# Patient Record
Sex: MALE | Race: WHITE
[De-identification: names, ages, dates, MRNs, and addresses within clinical notes are randomized per-mention and may not be internally consistent; named-entity substitution may affect disease eponyms.]

---

## 2017-03-21 ENCOUNTER — HOSPITAL ENCOUNTER (INPATIENT)
Dept: HOSPITAL 23 - CED | Age: 57
LOS: 3 days | Discharge: HOME | DRG: 897 | End: 2017-03-24
Admitting: INTERNAL MEDICINE
Payer: MEDICARE

## 2017-03-21 DIAGNOSIS — F10.239: Primary | ICD-10-CM

## 2017-03-21 DIAGNOSIS — Z82.49: ICD-10-CM

## 2017-03-21 DIAGNOSIS — F32.9: ICD-10-CM

## 2017-03-21 DIAGNOSIS — Z88.5: ICD-10-CM

## 2017-03-21 DIAGNOSIS — E66.9: ICD-10-CM

## 2017-03-21 DIAGNOSIS — Z80.8: ICD-10-CM

## 2017-03-21 DIAGNOSIS — Z90.49: ICD-10-CM

## 2017-03-21 DIAGNOSIS — F17.210: ICD-10-CM

## 2017-03-21 DIAGNOSIS — Z91.19: ICD-10-CM

## 2017-03-21 DIAGNOSIS — K21.9: ICD-10-CM

## 2017-03-21 DIAGNOSIS — S81.811A: ICD-10-CM

## 2017-03-21 DIAGNOSIS — F39: ICD-10-CM
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
## 2017-03-21 LAB
ALCOHOL BLOOD: 170 MG/DL
BARBITURATES UR QL SCN: 0.9 MG/DL (ref 0.2–2)
BARBITURATES UR QL SCN: 4.2 G/DL (ref 3.5–5)
BARBITURATES: (no result)
BASOPHIL#: 0 X10E3 (ref 0–0.3)
BASOPHIL%: 0.4 % (ref 0–2.5)
BENZODIAZ UR QL SCN: 48 U/L (ref 10–40)
BENZODIAZ UR QL SCN: 64 U/L (ref 10–42)
BENZODIAZEPINES: (no result)
BLOOD UREA NITROGEN: 11 MG/DL (ref 9–23)
BUN/CREATININE RATIO: 12.22
BZE UR QL SCN: 88 U/L (ref 32–92)
CALCIUM SERUM: 8.4 MG/DL (ref 8.4–10.2)
CK MB SERPL-RTO: 16.1 % (ref 11–15.5)
CK MB SERPL-RTO: 33.3 G/DL (ref 30–36)
COCAINE: (no result)
CREATININE SERUM: 0.9 MG/DL (ref 0.6–1.4)
DIFF IND: NO
DX ICD CODE: (no result)
DX ICD CODE: (no result)
EOSINOPHIL#: 0.1 X10E3 (ref 0–0.7)
EOSINOPHIL%: 0.7 % (ref 0–7)
GENTAMICIN PEAK SERPL-MCNC: NO MG/L
GLOM FILT RATE ESTIMATED: (no result) ML/MIN (ref 60–?)
GLUCOSE FASTING: 105 MG/DL (ref 70–110)
HEMATOCRIT: 47.5 % (ref 38–50)
HEMOGLOBIN: 15.8 GM/DL (ref 13–16)
HIV1+2 AB SPEC QL IA.RAPID: 24.4 SECONDS (ref 23.5–31.3)
INFLUENZA A: (no result)
INFLUENZA B: (no result)
INR: 1.1
KETONES UR QL: 102 MMOL/L (ref 100–111)
KETONES UR QL: 22 MMOL/L (ref 22–31)
LYMPHOCYTE#: 5.5 X10E3 (ref 1–3.5)
LYMPHOCYTE%: 46.1 % (ref 17–45)
MEAN CELL VOLUME: 90.6 FL (ref 83–96)
MEAN CORPUSCULAR HEMOGLOBIN: 30.1 PG (ref 28–34)
MEAN PLATELET VOLUME: 7.3 FL (ref 6.5–11.5)
METHADONE UR QL SCN: 3 NG/ML (ref 0–7.9)
MONOCYTE#: 0.6 X10E3 (ref 0–1)
MONOCYTE%: 5.1 % (ref 3–12)
NEUTROPHIL#: 5.7 X10E3 (ref 1.5–7.1)
NEUTROPHIL%: 47.7 % (ref 40–75)
OPIATES: (no result)
PCP UR QL SCN: 216 IU/L (ref 36–174)
PLATELET COUNT: 226 X10E3 (ref 140–420)
POC - TROPONIN: <0.05 NG/ML (ref ?–0.05)
POTASSIUM: 4 MMOL/L (ref 3.5–5.1)
PROTEIN TOTAL SERUM: 8.7 G/DL (ref 6–8.3)
PROTHROMBIN TIME (PATIENT): 11.4 SECONDS (ref 9.6–11.5)
RED BLOOD COUNT: 5.25 X10E (ref 3.9–5.6)
SODIUM: 141 MMOL/L (ref 135–145)
TRICYCLIC ANTIDEPRESSANTS: (no result)
U HYALINE CASTS AUWI: (no result) /[LPF]
U METHADONE: (no result)
URBCS1 AUWI: (no result) /[HPF] (ref 0–2)
URINE APPEARANCE: CLEAR
URINE BACTERIA AUWI: (no result)
URINE BILIRUBIN: (no result)
URINE BLOOD: (no result)
URINE COLOR: YELLOW
URINE GLUCOSE: (no result) MG/DL
URINE KETONE: (no result)
URINE LEUKOCYTE ESTERASE: (no result)
URINE NITRATE: (no result)
URINE PH: 6.5 (ref 5–8)
URINE PROTEIN: (no result)
URINE SOURCE: (no result)
URINE SPECIFIC GRAVITY: 1.02 (ref 1–1.03)
URINE SQUAMOUS EPITHELIAL CELL: (no result) /[HPF]
URINE UROBILINOGEN: 1 MG/DL
UWBCS1 AUWI: (no result) (ref 0–5)
WHITE BLOOD COUNT: 12 X10E3 (ref 4–10.5)

## 2017-03-21 PROCEDURE — G0480 DRUG TEST DEF 1-7 CLASSES: HCPCS

## 2017-03-21 PROCEDURE — C1751 CATH, INF, PER/CENT/MIDLINE: HCPCS

## 2017-03-22 LAB
BARBITURATES UR QL SCN: 1.1 MG/DL (ref 0.2–2)
BARBITURATES UR QL SCN: 3.7 G/DL (ref 3.5–5)
BASOPHIL#: 0 X10E3 (ref 0–0.3)
BASOPHIL%: 0.3 % (ref 0–2.5)
BENZODIAZ UR QL SCN: 38 U/L (ref 10–40)
BENZODIAZ UR QL SCN: 49 U/L (ref 10–42)
BLOOD UREA NITROGEN: 11 MG/DL (ref 9–23)
BUN/CREATININE RATIO: 13.75
BZE UR QL SCN: 78 U/L (ref 32–92)
CALCIUM SERUM: 8.3 MG/DL (ref 8.4–10.2)
CK MB SERPL-RTO: 15.3 % (ref 11–15.5)
CK MB SERPL-RTO: 33.4 G/DL (ref 30–36)
CREATININE SERUM: 0.8 MG/DL (ref 0.6–1.4)
DIFF IND: NO
EOSINOPHIL#: 0.1 X10E3 (ref 0–0.7)
EOSINOPHIL%: 1 % (ref 0–7)
FREE THYROXIN (T4): 0.64 NG/DL (ref 0.58–1.64)
GLOM FILT RATE ESTIMATED: (no result) ML/MIN (ref 60–?)
GLUCOSE FASTING: 119 MG/DL (ref 70–110)
HEMATOCRIT: 40 % (ref 38–50)
HEMOGLOBIN: 13.4 GM/DL (ref 13–16)
KETONES UR QL: 105 MMOL/L (ref 100–111)
KETONES UR QL: 24 MMOL/L (ref 22–31)
LYMPHOCYTE#: 2.8 X10E3 (ref 1–3.5)
LYMPHOCYTE%: 46.7 % (ref 17–45)
MEAN CELL VOLUME: 90.2 FL (ref 83–96)
MEAN CORPUSCULAR HEMOGLOBIN: 30.1 PG (ref 28–34)
MEAN PLATELET VOLUME: 7.2 FL (ref 6.5–11.5)
MONOCYTE#: 0.5 X10E3 (ref 0–1)
MONOCYTE%: 8.7 % (ref 3–12)
NEUTROPHIL#: 2.6 X10E3 (ref 1.5–7.1)
NEUTROPHIL%: 43.3 % (ref 40–75)
PLATELET COUNT: 126 X10E3 (ref 140–420)
POTASSIUM: 3.6 MMOL/L (ref 3.5–5.1)
PROTEIN TOTAL SERUM: 7.4 G/DL (ref 6–8.3)
RED BLOOD COUNT: 4.44 X10E (ref 3.9–5.6)
SODIUM: 138 MMOL/L (ref 135–145)
THYROID STIMULATING HORMONE: 1.07 UIU/ML (ref 0.34–5.6)
WHITE BLOOD COUNT: 5.9 X10E3 (ref 4–10.5)

## 2017-03-23 LAB
BLOOD UREA NITROGEN: 8 MG/DL (ref 9–23)
BUN/CREATININE RATIO: 13.33
CALCIUM SERUM: 8.6 MG/DL (ref 8.4–10.2)
CK MB SERPL-RTO: 15.9 % (ref 11–15.5)
CK MB SERPL-RTO: 33.9 G/DL (ref 30–36)
CREATININE SERUM: 0.6 MG/DL (ref 0.6–1.4)
GLOM FILT RATE ESTIMATED: (no result) ML/MIN (ref 60–?)
GLUCOSE FASTING: 123 MG/DL (ref 70–110)
HEMATOCRIT: 37.2 % (ref 38–50)
HEMOGLOBIN: 12.6 GM/DL (ref 13–16)
KETONES UR QL: 107 MMOL/L (ref 100–111)
KETONES UR QL: 24 MMOL/L (ref 22–31)
MAGNESIUM: 1.6 MG/DL (ref 1.6–3)
MEAN CELL VOLUME: 89.2 FL (ref 83–96)
MEAN CORPUSCULAR HEMOGLOBIN: 30.3 PG (ref 28–34)
MEAN PLATELET VOLUME: 7.3 FL (ref 6.5–11.5)
PLATELET COUNT: 102 X10E3 (ref 140–420)
POTASSIUM: 3.2 MMOL/L (ref 3.5–5.1)
RED BLOOD COUNT: 4.17 X10E (ref 3.9–5.6)
SODIUM: 138 MMOL/L (ref 135–145)
WHITE BLOOD COUNT: 4.6 X10E3 (ref 4–10.5)

## 2017-03-23 PROCEDURE — 02HV33Z INSERTION OF INFUSION DEVICE INTO SUPERIOR VENA CAVA, PERCUTANEOUS APPROACH: ICD-10-PCS | Performed by: RADIOLOGY

## 2017-03-23 PROCEDURE — B548ZZA ULTRASONOGRAPHY OF SUPERIOR VENA CAVA, GUIDANCE: ICD-10-PCS | Performed by: RADIOLOGY

## 2017-03-23 PROCEDURE — B518YZA FLUOROSCOPY OF SUPERIOR VENA CAVA USING OTHER CONTRAST, GUIDANCE: ICD-10-PCS | Performed by: RADIOLOGY

## 2017-03-24 LAB
BLOOD UREA NITROGEN: 11 MG/DL (ref 9–23)
BUN/CREATININE RATIO: 15.71
CALCIUM SERUM: 8.9 MG/DL (ref 8.4–10.2)
CREATININE SERUM: 0.7 MG/DL (ref 0.6–1.4)
GLOM FILT RATE ESTIMATED: 104.8 ML/MIN (ref 60–?)
GLUCOSE FASTING: 99 MG/DL (ref 70–110)
KETONES UR QL: 108 MMOL/L (ref 100–111)
KETONES UR QL: 24 MMOL/L (ref 22–31)
MAGNESIUM: 1.8 MG/DL (ref 1.6–3)
POTASSIUM: 3.9 MMOL/L (ref 3.5–5.1)
SODIUM: 139 MMOL/L (ref 135–145)

## 2017-03-31 ENCOUNTER — HOSPITAL ENCOUNTER (INPATIENT)
Dept: HOSPITAL 23 - P1E | Age: 57
LOS: 10 days | Discharge: HOME | DRG: 897 | End: 2017-04-10
Admitting: SPECIALIST
Payer: MEDICARE

## 2017-03-31 DIAGNOSIS — I10: ICD-10-CM

## 2017-03-31 DIAGNOSIS — F10.239: Primary | ICD-10-CM

## 2017-03-31 DIAGNOSIS — S90.121A: ICD-10-CM

## 2017-03-31 DIAGNOSIS — F10.24: ICD-10-CM

## 2017-03-31 PROCEDURE — G0480 DRUG TEST DEF 1-7 CLASSES: HCPCS

## 2017-04-01 LAB
BARBITURATES UR QL SCN: 0.8 MG/DL (ref 0.2–2)
BARBITURATES UR QL SCN: 3.7 G/DL (ref 3.5–5)
BASOPHIL#: 0 X10E3 (ref 0–0.3)
BASOPHIL%: 0.4 % (ref 0–2.5)
BENZODIAZ UR QL SCN: 30 U/L (ref 10–40)
BENZODIAZ UR QL SCN: 42 U/L (ref 10–42)
BLOOD UREA NITROGEN: 7 MG/DL (ref 9–23)
BUN/CREATININE RATIO: 6.36
BZE UR QL SCN: 71 U/L (ref 32–92)
CALCIUM SERUM: 9.2 MG/DL (ref 8.4–10.2)
CK MB SERPL-RTO: 16.5 % (ref 11–15.5)
CK MB SERPL-RTO: 33 G/DL (ref 30–36)
CREATININE SERUM: 1.1 MG/DL (ref 0.6–1.4)
DIFF IND: NO
EOSINOPHIL#: 0.1 X10E3 (ref 0–0.7)
EOSINOPHIL%: 1.3 % (ref 0–7)
FREE THYROXIN (T4): 0.54 NG/DL (ref 0.58–1.64)
GLOM FILT RATE ESTIMATED: 74.1 ML/MIN (ref 60–?)
GLUCOSE FASTING: 114 MG/DL (ref 70–110)
HEMATOCRIT: 43 % (ref 38–50)
HEMOGLOBIN: 14.2 GM/DL (ref 13–16)
KETONES UR QL: 103 MMOL/L (ref 100–111)
KETONES UR QL: 27 MMOL/L (ref 22–31)
LYMPHOCYTE#: 2.9 X10E3 (ref 1–3.5)
LYMPHOCYTE%: 40.7 % (ref 17–45)
MEAN CELL VOLUME: 92.8 FL (ref 83–96)
MEAN CORPUSCULAR HEMOGLOBIN: 30.7 PG (ref 28–34)
MEAN PLATELET VOLUME: 7.9 FL (ref 6.5–11.5)
MONOCYTE#: 0.9 X10E3 (ref 0–1)
MONOCYTE%: 12.2 % (ref 3–12)
NEUTROPHIL#: 3.2 X10E3 (ref 1.5–7.1)
NEUTROPHIL%: 45.4 % (ref 40–75)
PLATELET COUNT: 179 X10E3 (ref 140–420)
POTASSIUM: 3.9 MMOL/L (ref 3.5–5.1)
PROTEIN TOTAL SERUM: 7.6 G/DL (ref 6–8.3)
RED BLOOD COUNT: 4.63 X10E (ref 3.9–5.6)
SODIUM: 142 MMOL/L (ref 135–145)
THYROID STIMULATING HORMONE: 1.83 UIU/ML (ref 0.34–5.6)
WHITE BLOOD COUNT: 7.1 X10E3 (ref 4–10.5)

## 2017-04-02 LAB
BARBITURATES: (no result)
BENZODIAZEPINES: (no result)
COCAINE: (no result)
DX ICD CODE: (no result)
DX ICD CODE: (no result)
OPIATES: (no result)
TRICYCLIC ANTIDEPRESSANTS: (no result)
U METHADONE: (no result)
URINE APPEARANCE: CLEAR
URINE BILIRUBIN: (no result)
URINE BLOOD: (no result)
URINE COLOR: YELLOW
URINE GLUCOSE: (no result) MG/DL
URINE KETONE: (no result)
URINE LEUKOCYTE ESTERASE: (no result)
URINE NITRATE: (no result)
URINE PH: 7 (ref 5–8)
URINE PROTEIN: (no result)
URINE SOURCE: (no result)
URINE SPECIFIC GRAVITY: 1.01 (ref 1–1.03)
URINE UROBILINOGEN: 0.2 MG/DL

## 2017-04-28 ENCOUNTER — HOSPITAL ENCOUNTER (INPATIENT)
Dept: HOSPITAL 23 - P1E | Age: 57
LOS: 5 days | Discharge: HOME | DRG: 897 | End: 2017-05-03
Admitting: SPECIALIST
Payer: MEDICARE

## 2017-04-28 DIAGNOSIS — F10.239: Primary | ICD-10-CM

## 2017-04-28 DIAGNOSIS — F10.24: ICD-10-CM

## 2017-04-28 DIAGNOSIS — I85.00: ICD-10-CM

## 2017-04-28 DIAGNOSIS — B19.20: ICD-10-CM

## 2017-04-28 DIAGNOSIS — I10: ICD-10-CM

## 2017-04-28 DIAGNOSIS — Z86.73: ICD-10-CM

## 2017-04-28 PROCEDURE — HZ2ZZZZ DETOXIFICATION SERVICES FOR SUBSTANCE ABUSE TREATMENT: ICD-10-PCS | Performed by: SPECIALIST

## 2017-05-01 LAB
ANISOCYTOSIS: (no result)
BARBITURATES UR QL SCN: 0.7 MG/DL
BARBITURATES UR QL SCN: 3.6 G/DL
BASOPHIL#: 0 X10E3
BASOPHIL%: 0.3 %
BENZODIAZ UR QL SCN: 27 U/L
BENZODIAZ UR QL SCN: 33 U/L
BLOOD UREA NITROGEN: 13 MG/DL
BUN/CREATININE RATIO: 16.25
BZE UR QL SCN: 70 U/L
CALCIUM SERUM: 9 MG/DL
CK MB SERPL-RTO: 14.9 %
CK MB SERPL-RTO: 33.3 G/DL
CREATININE SERUM: 0.8 MG/DL
DIFF IND: YES
EOSINOPHIL#: 0.1 X10E3
EOSINOPHIL%: 2.9 %
EOSINOPHIL: 3 %
GLOM FILT RATE ESTIMATED: 99.2 ML/MIN
GLUCOSE FASTING: 141 MG/DL
HEMATOCRIT: 38.5 %
HEMOGLOBIN: 12.8 GM/DL
KETONES UR QL: 106 MMOL/L
KETONES UR QL: 27 MMOL/L
LYMPHOCYTE#: 1.9 X10E3
LYMPHOCYTE%: 48.6 %
LYMPHOCYTE: 49 %
MEAN CELL VOLUME: 93 FL
MEAN CORPUSCULAR HEMOGLOBIN: 31 PG
MEAN PLATELET VOLUME: 8.5 FL
MONOCYTE#: 0.2 X10E3
MONOCYTE%: 4 %
MONOCYTE: 2 %
NEUTROPHIL#: 1.8 X10E3
NEUTROPHIL%: 44.2 %
NEUTROPHIL: 46 %
PLATELET COUNT: 92 X10E3
PLATELET ESTIMATE: (no result)
POTASSIUM: 3.8 MMOL/L
PROTEIN TOTAL SERUM: 7 G/DL
RED BLOOD COUNT: 4.15 X10E
SODIUM: 138 MMOL/L
WHITE BLOOD COUNT: 4 X10E3

## 2017-05-02 LAB
BARBITURATES: (no result)
BENZODIAZEPINES: (no result)
COCAINE: (no result)
DX ICD CODE: (no result)
DX ICD CODE: (no result)
OPIATES: (no result)
TRICYCLIC ANTIDEPRESSANTS: (no result)
U METHADONE: (no result)
URINE APPEARANCE: (no result)
URINE BILIRUBIN: (no result)
URINE BLOOD: (no result)
URINE COLOR: (no result)
URINE GLUCOSE: (no result) MG/DL
URINE KETONE: (no result)
URINE LEUKOCYTE ESTERASE: (no result)
URINE NITRATE: (no result)
URINE PH: 8
URINE PROTEIN: (no result)
URINE SOURCE: (no result)
URINE SPECIFIC GRAVITY: 1.02
URINE UROBILINOGEN: 1 MG/DL

## 2017-07-10 ENCOUNTER — HOSPITAL ENCOUNTER (INPATIENT)
Dept: HOSPITAL 23 - P2S | Age: 57
LOS: 4 days | DRG: 897 | End: 2017-07-14
Attending: SPECIALIST | Admitting: SPECIALIST
Payer: MEDICARE

## 2017-07-10 DIAGNOSIS — Z88.5: ICD-10-CM

## 2017-07-10 DIAGNOSIS — I10: ICD-10-CM

## 2017-07-10 DIAGNOSIS — F11.23: ICD-10-CM

## 2017-07-10 DIAGNOSIS — F10.239: Primary | ICD-10-CM

## 2017-07-10 DIAGNOSIS — I85.00: ICD-10-CM

## 2017-07-10 DIAGNOSIS — B19.20: ICD-10-CM

## 2017-07-10 DIAGNOSIS — Z86.73: ICD-10-CM

## 2017-07-10 DIAGNOSIS — F10.24: ICD-10-CM

## 2017-07-11 PROCEDURE — HZ2ZZZZ DETOXIFICATION SERVICES FOR SUBSTANCE ABUSE TREATMENT: ICD-10-PCS | Performed by: SPECIALIST

## 2017-07-17 ENCOUNTER — HOSPITAL ENCOUNTER (INPATIENT)
Dept: HOSPITAL 23 - CED | Age: 57
LOS: 8 days | Discharge: HOME | DRG: 896 | End: 2017-07-25
Attending: INTERNAL MEDICINE | Admitting: INTERNAL MEDICINE
Payer: MEDICARE

## 2017-07-17 VITALS — WEIGHT: 218.26 LBS | BODY MASS INDEX: 32.33 KG/M2 | HEIGHT: 69 IN

## 2017-07-17 DIAGNOSIS — K76.6: ICD-10-CM

## 2017-07-17 DIAGNOSIS — B37.81: ICD-10-CM

## 2017-07-17 DIAGNOSIS — R19.7: ICD-10-CM

## 2017-07-17 DIAGNOSIS — K29.20: ICD-10-CM

## 2017-07-17 DIAGNOSIS — K85.20: ICD-10-CM

## 2017-07-17 DIAGNOSIS — F10.239: Primary | ICD-10-CM

## 2017-07-17 DIAGNOSIS — Z80.9: ICD-10-CM

## 2017-07-17 DIAGNOSIS — Z82.3: ICD-10-CM
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
## 2017-07-17 DIAGNOSIS — K70.30: ICD-10-CM

## 2017-07-17 DIAGNOSIS — K72.90: ICD-10-CM

## 2017-07-17 DIAGNOSIS — Z82.49: ICD-10-CM

## 2017-07-17 DIAGNOSIS — I48.0: ICD-10-CM

## 2017-07-17 DIAGNOSIS — Z90.49: ICD-10-CM

## 2017-07-17 DIAGNOSIS — I10: ICD-10-CM

## 2017-07-17 LAB
ACETAMINOPHEN: <10 UG/ML
ALCOHOL BLOOD: 328 MG/DL
BAND: 4 % (ref 0–10)
BARBITURATES UR QL SCN: 0.8 MG/DL (ref 0.2–2)
BARBITURATES UR QL SCN: 3.8 G/DL (ref 3.5–5)
BARBITURATES: (no result)
BASOPHIL#: 0 X10E3 (ref 0–0.3)
BASOPHIL%: 0.5 % (ref 0–2.5)
BENZODIAZ UR QL SCN: 28 U/L (ref 10–40)
BENZODIAZ UR QL SCN: 41 U/L (ref 10–42)
BENZODIAZEPINES: (no result)
BLOOD UREA NITROGEN: 5 MG/DL (ref 9–23)
BUN/CREATININE RATIO: 6.25
BZE UR QL SCN: 65 U/L (ref 32–92)
CALCIUM SERUM: 8.5 MG/DL (ref 8.4–10.2)
CK MB SERPL-RTO: 15 % (ref 11–15.5)
CK MB SERPL-RTO: 33.2 G/DL (ref 30–36)
COCAINE: (no result)
CREATININE SERUM: 0.8 MG/DL (ref 0.6–1.4)
DIFF IND: YES
DX ICD CODE: (no result)
DX ICD CODE: (no result)
EOSINOPHIL#: 0 X10E3 (ref 0–0.7)
EOSINOPHIL%: 0.9 % (ref 0–7)
EOSINOPHIL: 1 % (ref 0–7)
ETHANOL BLD GC-MCNC: <4 MG/DL
GLOM FILT RATE ESTIMATED: 99.2 ML/MIN (ref 60–?)
GLUCOSE FASTING: 186 MG/DL (ref 70–110)
HEMATOCRIT: 38.2 % (ref 38–50)
HEMOGLOBIN: 12.7 GM/DL (ref 13–16)
HIV1+2 AB SPEC QL IA.RAPID: 25.2 SECONDS (ref 23.5–31.3)
INR: 1
KETONES UR QL: 111 MMOL/L (ref 100–111)
KETONES UR QL: 26 MMOL/L (ref 22–31)
LYMPHOCYTE#: 1.8 X10E3 (ref 1–3.5)
LYMPHOCYTE%: 45.3 % (ref 17–45)
LYMPHOCYTE: 47 % (ref 17–50)
MACROCYTOSIS: (no result)
MEAN CELL VOLUME: 94.4 FL (ref 83–96)
MEAN CORPUSCULAR HEMOGLOBIN: 31.4 PG (ref 28–34)
MEAN PLATELET VOLUME: 8.3 FL (ref 6.5–11.5)
METHADONE UR QL SCN: 2 NG/ML (ref 0–7.9)
MONOCYTE#: 0.5 X10E3 (ref 0–1)
MONOCYTE%: 13.5 % (ref 3–12)
MONOCYTE: 9 % (ref 3–12)
NEUTROPHIL#: 1.6 X10E3 (ref 1.5–7.1)
NEUTROPHIL%: 39.8 % (ref 40–75)
NEUTROPHIL: 39 % (ref 40–75)
OPIATES: (no result)
PLATELET COUNT: 81 X10E3 (ref 140–420)
PLATELET ESTIMATE: (no result)
POC - TROPONIN: <0.05 NG/ML (ref ?–0.05)
POTASSIUM: 3.9 MMOL/L (ref 3.5–5.1)
PROTEIN TOTAL SERUM: 7.7 G/DL (ref 6–8.3)
PROTHROMBIN TIME (PATIENT): 11.3 SECONDS (ref 10–11.7)
RED BLOOD COUNT: 4.05 X10E (ref 3.9–5.6)
SODIUM: 144 MMOL/L (ref 135–145)
TRICYCLIC ANTIDEPRESSANTS: (no result)
U METHADONE: (no result)
WHITE BLOOD COUNT: 4.1 X10E3 (ref 4–10.5)

## 2017-07-17 PROCEDURE — G0480 DRUG TEST DEF 1-7 CLASSES: HCPCS

## 2017-07-17 PROCEDURE — C9113 INJ PANTOPRAZOLE SODIUM, VIA: HCPCS

## 2017-07-18 LAB
AMYLASE: 39 U/L (ref 0–46)
ANISOCYTOSIS: (no result)
BAND: 3 % (ref 0–10)
BASOPHIL#: 0 X10E3 (ref 0–0.3)
BASOPHIL%: 0.4 % (ref 0–2.5)
BLOOD UREA NITROGEN: 5 MG/DL (ref 9–23)
BUN/CREATININE RATIO: 8.33
CALCIUM SERUM: 8.3 MG/DL (ref 8.4–10.2)
CK MB SERPL-RTO: 15.3 % (ref 11–15.5)
CK MB SERPL-RTO: 33.2 G/DL (ref 30–36)
CREATININE SERUM: 0.6 MG/DL (ref 0.6–1.4)
DIFF IND: YES
EOSINOPHIL#: 0.1 X10E3 (ref 0–0.7)
EOSINOPHIL%: 1.8 % (ref 0–7)
EOSINOPHIL: 1 % (ref 0–7)
GLOM FILT RATE ESTIMATED: 111.7 ML/MIN (ref 60–?)
GLUCOSE FASTING: 110 MG/DL (ref 70–110)
HEMATOCRIT: 36.2 % (ref 38–50)
HEMOGLOBIN: 12 GM/DL (ref 13–16)
KETONES UR QL: 105 MMOL/L (ref 100–111)
KETONES UR QL: 28 MMOL/L (ref 22–31)
LIPASE: 23 U/L (ref 22–51)
LYMPHOCYTE#: 1.2 X10E3 (ref 1–3.5)
LYMPHOCYTE%: 41.8 % (ref 17–45)
LYMPHOCYTE: 41 % (ref 17–50)
MEAN CELL VOLUME: 93.2 FL (ref 83–96)
MEAN CORPUSCULAR HEMOGLOBIN: 31 PG (ref 28–34)
MEAN PLATELET VOLUME: 7.5 FL (ref 6.5–11.5)
MONOCYTE#: 0.4 X10E3 (ref 0–1)
MONOCYTE%: 13.6 % (ref 3–12)
MONOCYTE: 13 % (ref 3–12)
NEUTROPHIL#: 1.2 X10E3 (ref 1.5–7.1)
NEUTROPHIL%: 42.4 % (ref 40–75)
NEUTROPHIL: 42 % (ref 40–75)
PLATELET COUNT: 94 X10E3 (ref 140–420)
PLATELET ESTIMATE: (no result)
POIKILOCYTOSIS: (no result)
POTASSIUM: 3.5 MMOL/L (ref 3.5–5.1)
RED BLOOD COUNT: 3.89 X10E (ref 3.9–5.6)
SODIUM: 139 MMOL/L (ref 135–145)
WHITE BLOOD COUNT: 2.8 X10E3 (ref 4–10.5)

## 2017-07-19 LAB
AMYLASE: 28 U/L (ref 0–46)
BARBITURATES UR QL SCN: 1 MG/DL (ref 0.2–2)
BARBITURATES UR QL SCN: 3.8 G/DL (ref 3.5–5)
BENZODIAZ UR QL SCN: 30 U/L (ref 10–40)
BENZODIAZ UR QL SCN: 47 U/L (ref 10–42)
BLOOD UREA NITROGEN: 5 MG/DL (ref 9–23)
BUN/CREATININE RATIO: 6.25
BZE UR QL SCN: 80 U/L (ref 32–92)
CALCIUM SERUM: 8.7 MG/DL (ref 8.4–10.2)
CK MB SERPL-RTO: 15.4 % (ref 11–15.5)
CK MB SERPL-RTO: 33.1 G/DL (ref 30–36)
CREATININE SERUM: 0.8 MG/DL (ref 0.6–1.4)
GLOM FILT RATE ESTIMATED: 99.2 ML/MIN (ref 60–?)
GLUCOSE FASTING: 87 MG/DL (ref 70–110)
HEMATOCRIT: 39.4 % (ref 38–50)
HEMOGLOBIN: 13.1 GM/DL (ref 13–16)
HIV1+2 AB SPEC QL IA.RAPID: 27.8 SECONDS (ref 23.5–31.3)
INR: 1.1
KETONES UR QL: 102 MMOL/L (ref 100–111)
KETONES UR QL: 27 MMOL/L (ref 22–31)
LIPASE: 18 U/L (ref 22–51)
MEAN CELL VOLUME: 93.7 FL (ref 83–96)
MEAN CORPUSCULAR HEMOGLOBIN: 31.1 PG (ref 28–34)
MEAN PLATELET VOLUME: 7.6 FL (ref 6.5–11.5)
PLATELET COUNT: 132 X10E3 (ref 140–420)
POTASSIUM: 3.7 MMOL/L (ref 3.5–5.1)
PROTEIN TOTAL SERUM: 7.9 G/DL (ref 6–8.3)
PROTHROMBIN TIME (PATIENT): 11.7 SECONDS (ref 10–11.7)
RED BLOOD COUNT: 4.2 X10E (ref 3.9–5.6)
SODIUM: 136 MMOL/L (ref 135–145)
WHITE BLOOD COUNT: 4.8 X10E3 (ref 4–10.5)

## 2017-07-20 LAB
BARBITURATES UR QL SCN: 1.2 MG/DL (ref 0.2–2)
BARBITURATES UR QL SCN: 3.8 G/DL (ref 3.5–5)
BENZODIAZ UR QL SCN: 34 U/L (ref 10–40)
BENZODIAZ UR QL SCN: 60 U/L (ref 10–42)
BLOOD UREA NITROGEN: 6 MG/DL (ref 9–23)
BUN/CREATININE RATIO: 7.5
BZE UR QL SCN: 86 U/L (ref 32–92)
CALCIUM SERUM: 8.5 MG/DL (ref 8.4–10.2)
CK MB SERPL-RTO: 15.2 % (ref 11–15.5)
CK MB SERPL-RTO: 33.4 G/DL (ref 30–36)
CREATININE SERUM: 0.8 MG/DL (ref 0.6–1.4)
GLOM FILT RATE ESTIMATED: 99.2 ML/MIN (ref 60–?)
GLUCOSE FASTING: 88 MG/DL (ref 70–110)
HEMATOCRIT: 41.2 % (ref 38–50)
HEMOGLOBIN: 13.8 GM/DL (ref 13–16)
KETONES UR QL: 24 MMOL/L (ref 22–31)
KETONES UR QL: 99 MMOL/L (ref 100–111)
MAGNESIUM: 1.7 MG/DL (ref 1.6–3)
MEAN CELL VOLUME: 93.8 FL (ref 83–96)
MEAN CORPUSCULAR HEMOGLOBIN: 31.4 PG (ref 28–34)
MEAN PLATELET VOLUME: 7.6 FL (ref 6.5–11.5)
PHOSPHOROUS: 2.3 MG/DL (ref 2.5–4.6)
PLATELET COUNT: 134 X10E3 (ref 140–420)
POTASSIUM: 4.2 MMOL/L (ref 3.5–5.1)
PROTEIN TOTAL SERUM: 7.7 G/DL (ref 6–8.3)
RED BLOOD COUNT: 4.39 X10E (ref 3.9–5.6)
SODIUM: 133 MMOL/L (ref 135–145)
WHITE BLOOD COUNT: 7.2 X10E3 (ref 4–10.5)

## 2017-07-20 PROCEDURE — 0DB58ZX EXCISION OF ESOPHAGUS, VIA NATURAL OR ARTIFICIAL OPENING ENDOSCOPIC, DIAGNOSTIC: ICD-10-PCS | Performed by: SURGERY

## 2017-07-20 PROCEDURE — 0DB78ZX EXCISION OF STOMACH, PYLORUS, VIA NATURAL OR ARTIFICIAL OPENING ENDOSCOPIC, DIAGNOSTIC: ICD-10-PCS | Performed by: SURGERY

## 2017-07-21 LAB
BARBITURATES UR QL SCN: 1.3 MG/DL (ref 0.2–2)
BARBITURATES UR QL SCN: 3.9 G/DL (ref 3.5–5)
BENZODIAZ UR QL SCN: 33 U/L (ref 10–40)
BENZODIAZ UR QL SCN: 55 U/L (ref 10–42)
BLOOD UREA NITROGEN: 9 MG/DL (ref 9–23)
BUN/CREATININE RATIO: 9
BZE UR QL SCN: 76 U/L (ref 32–92)
CALCIUM SERUM: 8.4 MG/DL (ref 8.4–10.2)
CK MB SERPL-RTO: 15.3 % (ref 11–15.5)
CK MB SERPL-RTO: 33.6 G/DL (ref 30–36)
CREATININE SERUM: 1 MG/DL (ref 0.6–1.4)
GLOM FILT RATE ESTIMATED: 83.2 ML/MIN (ref 60–?)
GLUCOSE FASTING: 115 MG/DL (ref 70–110)
HEMATOCRIT: 39.5 % (ref 38–50)
HEMOGLOBIN: 13.3 GM/DL (ref 13–16)
KETONES UR QL: 26 MMOL/L (ref 22–31)
KETONES UR QL: 99 MMOL/L (ref 100–111)
MAGNESIUM: 1.8 MG/DL (ref 1.6–3)
MEAN CELL VOLUME: 93.2 FL (ref 83–96)
MEAN CORPUSCULAR HEMOGLOBIN: 31.3 PG (ref 28–34)
MEAN PLATELET VOLUME: 7 FL (ref 6.5–11.5)
PHOSPHOROUS: 2.6 MG/DL (ref 2.5–4.6)
PLATELET COUNT: 147 X10E3 (ref 140–420)
POTASSIUM: 3.7 MMOL/L (ref 3.5–5.1)
PROTEIN TOTAL SERUM: 8.2 G/DL (ref 6–8.3)
RED BLOOD COUNT: 4.24 X10E (ref 3.9–5.6)
SODIUM: 132 MMOL/L (ref 135–145)
WHITE BLOOD COUNT: 8.5 X10E3 (ref 4–10.5)

## 2017-07-22 LAB
BLOOD UREA NITROGEN: 9 MG/DL (ref 9–23)
BUN/CREATININE RATIO: 11.25
CALCIUM SERUM: 8.1 MG/DL (ref 8.4–10.2)
CK MB SERPL-RTO: 15.4 % (ref 11–15.5)
CK MB SERPL-RTO: 33.2 G/DL (ref 30–36)
CREATININE SERUM: 0.8 MG/DL (ref 0.6–1.4)
GLOM FILT RATE ESTIMATED: 99.2 ML/MIN (ref 60–?)
GLUCOSE FASTING: 91 MG/DL (ref 70–110)
HEMATOCRIT: 39.1 % (ref 38–50)
HEMOGLOBIN: 13 GM/DL (ref 13–16)
KETONES UR QL: 109 MMOL/L (ref 100–111)
KETONES UR QL: 23 MMOL/L (ref 22–31)
MAGNESIUM: 1.9 MG/DL (ref 1.6–3)
MEAN CELL VOLUME: 93.6 FL (ref 83–96)
MEAN CORPUSCULAR HEMOGLOBIN: 31.1 PG (ref 28–34)
MEAN PLATELET VOLUME: 7.6 FL (ref 6.5–11.5)
PLATELET COUNT: 111 X10E3 (ref 140–420)
POTASSIUM: 4.3 MMOL/L (ref 3.5–5.1)
RED BLOOD COUNT: 4.17 X10E (ref 3.9–5.6)
SODIUM: 139 MMOL/L (ref 135–145)
WHITE BLOOD COUNT: 6.5 X10E3 (ref 4–10.5)

## 2017-07-23 LAB
GENTAMICIN PEAK SERPL-MCNC: NO MG/L
URINE APPEARANCE: CLEAR
URINE BILIRUBIN: (no result)
URINE BLOOD: (no result)
URINE COLOR: YELLOW
URINE GLUCOSE: (no result) MG/DL
URINE KETONE: (no result)
URINE LEUKOCYTE ESTERASE: (no result)
URINE NITRATE: (no result)
URINE PH: 5 (ref 5–8)
URINE PROTEIN: (no result)
URINE SOURCE: (no result)
URINE SPECIFIC GRAVITY: 1.01 (ref 1–1.03)
URINE UROBILINOGEN: 0.2 MG/DL

## 2017-07-24 LAB
BLOOD UREA NITROGEN: 5 MG/DL (ref 9–23)
BUN/CREATININE RATIO: 6.25
CALCIUM SERUM: 8.5 MG/DL (ref 8.4–10.2)
CK MB SERPL-RTO: 15.4 % (ref 11–15.5)
CK MB SERPL-RTO: 33.5 G/DL (ref 30–36)
CREATININE SERUM: 0.8 MG/DL (ref 0.6–1.4)
GLOM FILT RATE ESTIMATED: 99.2 ML/MIN (ref 60–?)
GLUCOSE FASTING: 98 MG/DL (ref 70–110)
HEMATOCRIT: 37.3 % (ref 38–50)
HEMOGLOBIN: 12.5 GM/DL (ref 13–16)
KETONES UR QL: 101 MMOL/L (ref 100–111)
KETONES UR QL: 30 MMOL/L (ref 22–31)
MEAN CELL VOLUME: 92.7 FL (ref 83–96)
MEAN CORPUSCULAR HEMOGLOBIN: 31 PG (ref 28–34)
MEAN PLATELET VOLUME: 7.3 FL (ref 6.5–11.5)
PLATELET COUNT: 156 X10E3 (ref 140–420)
POTASSIUM: 3.4 MMOL/L (ref 3.5–5.1)
RED BLOOD COUNT: 4.02 X10E (ref 3.9–5.6)
SODIUM: 138 MMOL/L (ref 135–145)
WHITE BLOOD COUNT: 5.5 X10E3 (ref 4–10.5)

## 2017-07-24 PROCEDURE — B246YZZ ULTRASONOGRAPHY OF RIGHT AND LEFT HEART USING OTHER CONTRAST: ICD-10-PCS | Performed by: INTERNAL MEDICINE

## 2017-07-25 LAB
BLOOD UREA NITROGEN: 7 MG/DL (ref 9–23)
BUN/CREATININE RATIO: 7.77
CALCIUM SERUM: 9 MG/DL (ref 8.4–10.2)
CREATININE SERUM: 0.9 MG/DL (ref 0.6–1.4)
GLOM FILT RATE ESTIMATED: 94.5 ML/MIN (ref 60–?)
GLUCOSE FASTING: 99 MG/DL (ref 70–110)
KETONES UR QL: 100 MMOL/L (ref 100–111)
KETONES UR QL: 31 MMOL/L (ref 22–31)
LIPASE: 60 U/L (ref 22–51)
MAGNESIUM: 2 MG/DL (ref 1.6–3)
PHOSPHOROUS: 4.6 MG/DL (ref 2.5–4.6)
POTASSIUM: 3.9 MMOL/L (ref 3.5–5.1)
SODIUM: 139 MMOL/L (ref 135–145)

## 2017-07-31 ENCOUNTER — HOSPITAL ENCOUNTER (INPATIENT)
Dept: HOSPITAL 23 - P1E | Age: 57
LOS: 6 days | Discharge: HOME | DRG: 897 | End: 2017-08-06
Attending: SPECIALIST | Admitting: SPECIALIST
Payer: MEDICARE

## 2017-07-31 VITALS — BODY MASS INDEX: 31.39 KG/M2 | HEIGHT: 67 IN | WEIGHT: 200 LBS
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
## 2017-07-31 DIAGNOSIS — K70.30: ICD-10-CM

## 2017-07-31 DIAGNOSIS — B19.20: ICD-10-CM

## 2017-07-31 DIAGNOSIS — I10: ICD-10-CM

## 2017-07-31 DIAGNOSIS — F10.24: ICD-10-CM

## 2017-07-31 DIAGNOSIS — F10.239: Primary | ICD-10-CM

## 2017-07-31 DIAGNOSIS — I85.10: ICD-10-CM

## 2017-07-31 PROCEDURE — G0480 DRUG TEST DEF 1-7 CLASSES: HCPCS

## 2017-07-31 PROCEDURE — C9113 INJ PANTOPRAZOLE SODIUM, VIA: HCPCS

## 2017-08-01 PROCEDURE — HZ2ZZZZ DETOXIFICATION SERVICES FOR SUBSTANCE ABUSE TREATMENT: ICD-10-PCS | Performed by: SPECIALIST

## 2017-08-11 ENCOUNTER — HOSPITAL ENCOUNTER (INPATIENT)
Dept: HOSPITAL 23 - P1E | Age: 57
LOS: 7 days | Discharge: HOME | DRG: 897 | End: 2017-08-18
Attending: SPECIALIST | Admitting: SPECIALIST
Payer: MEDICARE

## 2017-08-11 VITALS — HEIGHT: 67 IN | WEIGHT: 249 LBS | BODY MASS INDEX: 39.08 KG/M2

## 2017-08-11 DIAGNOSIS — B18.2: ICD-10-CM

## 2017-08-11 DIAGNOSIS — K21.9: ICD-10-CM

## 2017-08-11 DIAGNOSIS — I10: ICD-10-CM

## 2017-08-11 DIAGNOSIS — M79.1: ICD-10-CM

## 2017-08-11 DIAGNOSIS — F10.24: ICD-10-CM

## 2017-08-11 DIAGNOSIS — F10.230: Primary | ICD-10-CM

## 2017-08-11 DIAGNOSIS — K74.60: ICD-10-CM

## 2017-08-11 PROCEDURE — HZ2ZZZZ DETOXIFICATION SERVICES FOR SUBSTANCE ABUSE TREATMENT: ICD-10-PCS | Performed by: SPECIALIST

## 2017-08-29 ENCOUNTER — HOSPITAL ENCOUNTER (EMERGENCY)
Dept: HOSPITAL 23 - CED | Age: 57
Discharge: HOME | End: 2017-08-29
Payer: MEDICARE

## 2017-08-29 VITALS — BODY MASS INDEX: 30.31 KG/M2 | HEIGHT: 68 IN | WEIGHT: 199.98 LBS

## 2017-08-29 DIAGNOSIS — R55: Primary | ICD-10-CM

## 2017-08-29 DIAGNOSIS — I10: ICD-10-CM

## 2017-08-29 DIAGNOSIS — F15.10: ICD-10-CM

## 2017-08-29 LAB
ALCOHOL BLOOD: <5 MG/DL
BARBITURATES UR QL SCN: 1.3 MG/DL (ref 0.2–2)
BARBITURATES UR QL SCN: 3.9 G/DL (ref 3.5–5)
BARBITURATES: (no result)
BASOPHIL#: 0 X10E3 (ref 0–0.3)
BASOPHIL%: 0.8 % (ref 0–2.5)
BENZODIAZ UR QL SCN: 30 U/L (ref 10–40)
BENZODIAZ UR QL SCN: 36 U/L (ref 10–42)
BENZODIAZEPINES: (no result)
BLOOD UREA NITROGEN: 15 MG/DL (ref 9–23)
BUN/CREATININE RATIO: 15
BZE UR QL SCN: 50 U/L (ref 32–92)
CALCIUM SERUM: 8.7 MG/DL (ref 8.4–10.2)
CK MB SERPL-RTO: 16.1 % (ref 11–15.5)
CK MB SERPL-RTO: 33.1 G/DL (ref 30–36)
COCAINE: (no result)
CREATININE SERUM: 1 MG/DL (ref 0.6–1.4)
DIFF IND: NO
DX ICD CODE: (no result)
DX ICD CODE: (no result)
EOSINOPHIL#: 0.1 X10E3 (ref 0–0.7)
EOSINOPHIL%: 1 % (ref 0–7)
GLOM FILT RATE ESTIMATED: 83.2 ML/MIN (ref 60–?)
GLUCOSE FASTING: 120 MG/DL (ref 70–110)
HEMATOCRIT: 39.7 % (ref 38–50)
HEMOGLOBIN: 13.1 GM/DL (ref 13–16)
INR: 1.1
KETONES UR QL: 108 MMOL/L (ref 100–111)
KETONES UR QL: 25 MMOL/L (ref 22–31)
LYMPHOCYTE#: 1.9 X10E3 (ref 1–3.5)
LYMPHOCYTE%: 30.1 % (ref 17–45)
MEAN CELL VOLUME: 92.4 FL (ref 83–96)
MEAN CORPUSCULAR HEMOGLOBIN: 30.6 PG (ref 28–34)
MEAN PLATELET VOLUME: 7.5 FL (ref 6.5–11.5)
METHADONE UR QL SCN: 1.9 NG/ML (ref 0–7.9)
MONOCYTE#: 0.4 X10E3 (ref 0–1)
MONOCYTE%: 7 % (ref 3–12)
NEUTROPHIL#: 3.8 X10E3 (ref 1.5–7.1)
NEUTROPHIL%: 61.1 % (ref 40–75)
OPIATES: (no result)
PLATELET COUNT: 180 X10E3 (ref 140–420)
POC - TROPONIN: <0.05 NG/ML (ref ?–0.05)
POTASSIUM: 4.1 MMOL/L (ref 3.5–5.1)
PROTEIN TOTAL SERUM: 7.6 G/DL (ref 6–8.3)
PROTHROMBIN TIME (PATIENT): 12.2 SECONDS (ref 10–11.7)
RED BLOOD COUNT: 4.29 X10E (ref 3.9–5.6)
SODIUM: 140 MMOL/L (ref 135–145)
TRICYCLIC ANTIDEPRESSANTS: (no result)
U METHADONE: (no result)
WHITE BLOOD COUNT: 6.3 X10E3 (ref 4–10.5)

## 2017-08-29 PROCEDURE — G0480 DRUG TEST DEF 1-7 CLASSES: HCPCS

## 2017-09-10 ENCOUNTER — HOSPITAL ENCOUNTER (INPATIENT)
Facility: HOSPITAL | Age: 57
LOS: 3 days | Discharge: HOME | End: 2017-09-13
Attending: EMERGENCY MEDICINE | Admitting: INTERNAL MEDICINE

## 2017-09-10 DIAGNOSIS — I48.91 ATRIAL FIBRILLATION WITH RVR (HCC): Primary | ICD-10-CM

## 2017-09-10 DIAGNOSIS — F10.929 ALCOHOL INTOXICATION, WITH UNSPECIFIED COMPLICATION (HCC): ICD-10-CM

## 2017-09-10 DIAGNOSIS — F10.10 ALCOHOL ABUSE: ICD-10-CM

## 2017-09-10 PROBLEM — F10.931 DTS (DELIRIUM TREMENS) (HCC): Status: ACTIVE | Noted: 2017-09-10

## 2017-09-10 PROBLEM — D72.820 LYMPHOCYTOSIS: Status: ACTIVE | Noted: 2017-09-10

## 2017-09-10 LAB
ALBUMIN SERPL-MCNC: 4.5 G/DL (ref 3.5–5.2)
ALBUMIN/GLOB SERPL: 1.1 G/DL
ALP SERPL-CCNC: 82 U/L (ref 39–117)
ALT SERPL W P-5'-P-CCNC: 55 U/L (ref 1–41)
ANION GAP SERPL CALCULATED.3IONS-SCNC: 14.9 MMOL/L
ANION GAP SERPL CALCULATED.3IONS-SCNC: 20.7 MMOL/L
AST SERPL-CCNC: 95 U/L (ref 1–40)
BASOPHILS # BLD AUTO: 0.02 10*3/MM3 (ref 0–0.2)
BASOPHILS NFR BLD AUTO: 0.2 % (ref 0–1.5)
BILIRUB SERPL-MCNC: 0.4 MG/DL (ref 0.1–1.2)
BUN BLD-MCNC: 4 MG/DL (ref 6–20)
BUN BLD-MCNC: 4 MG/DL (ref 6–20)
BUN/CREAT SERPL: 5.1 (ref 7–25)
BUN/CREAT SERPL: 5.6 (ref 7–25)
CALCIUM SPEC-SCNC: 7.7 MG/DL (ref 8.6–10.5)
CALCIUM SPEC-SCNC: 8.9 MG/DL (ref 8.6–10.5)
CHLORIDE SERPL-SCNC: 102 MMOL/L (ref 98–107)
CHLORIDE SERPL-SCNC: 104 MMOL/L (ref 98–107)
CLUMPED PLATELETS: PRESENT
CO2 SERPL-SCNC: 23.3 MMOL/L (ref 22–29)
CO2 SERPL-SCNC: 26.1 MMOL/L (ref 22–29)
CREAT BLD-MCNC: 0.71 MG/DL (ref 0.76–1.27)
CREAT BLD-MCNC: 0.79 MG/DL (ref 0.76–1.27)
DEPRECATED RDW RBC AUTO: 47.9 FL (ref 37–54)
EOSINOPHIL # BLD AUTO: 0.03 10*3/MM3 (ref 0–0.7)
EOSINOPHIL NFR BLD AUTO: 0.4 % (ref 0.3–6.2)
ERYTHROCYTE [DISTWIDTH] IN BLOOD BY AUTOMATED COUNT: 14.6 % (ref 11.5–14.5)
ETHANOL BLD-MCNC: 282 MG/DL (ref 0–10)
ETHANOL BLD-MCNC: 417 MG/DL (ref 0–10)
ETHANOL UR QL: 0.28 %
ETHANOL UR QL: 0.42 %
GFR SERPL CREATININE-BSD FRML MDRD: 101 ML/MIN/1.73
GFR SERPL CREATININE-BSD FRML MDRD: 114 ML/MIN/1.73
GLOBULIN UR ELPH-MCNC: 4.2 GM/DL
GLUCOSE BLD-MCNC: 104 MG/DL (ref 65–99)
GLUCOSE BLD-MCNC: 117 MG/DL (ref 65–99)
HCT VFR BLD AUTO: 41.9 % (ref 40.4–52.2)
HGB BLD-MCNC: 14.2 G/DL (ref 13.7–17.6)
IMM GRANULOCYTES # BLD: 0 10*3/MM3 (ref 0–0.03)
IMM GRANULOCYTES NFR BLD: 0 % (ref 0–0.5)
INR PPP: 1.08 (ref 0.9–1.1)
LYMPHOCYTES # BLD AUTO: 5.52 10*3/MM3 (ref 0.9–4.8)
LYMPHOCYTES NFR BLD AUTO: 66.9 % (ref 19.6–45.3)
MCH RBC QN AUTO: 30.9 PG (ref 27–32.7)
MCHC RBC AUTO-ENTMCNC: 33.9 G/DL (ref 32.6–36.4)
MCV RBC AUTO: 91.1 FL (ref 79.8–96.2)
MONOCYTES # BLD AUTO: 0.69 10*3/MM3 (ref 0.2–1.2)
MONOCYTES NFR BLD AUTO: 8.4 % (ref 5–12)
NEUTROPHILS # BLD AUTO: 1.99 10*3/MM3 (ref 1.9–8.1)
NEUTROPHILS NFR BLD AUTO: 24.1 % (ref 42.7–76)
NRBC BLD MANUAL-RTO: 0 /100 WBC (ref 0–0)
PLATELET # BLD AUTO: 156 10*3/MM3 (ref 140–500)
PMV BLD AUTO: 9.9 FL (ref 6–12)
POLYCHROMASIA BLD QL SMEAR: NORMAL
POTASSIUM BLD-SCNC: 3.5 MMOL/L (ref 3.5–5.2)
POTASSIUM BLD-SCNC: 4 MMOL/L (ref 3.5–5.2)
PROT SERPL-MCNC: 8.7 G/DL (ref 6–8.5)
PROTHROMBIN TIME: 13.6 SECONDS (ref 11.7–14.2)
RBC # BLD AUTO: 4.6 10*6/MM3 (ref 4.6–6)
SODIUM BLD-SCNC: 145 MMOL/L (ref 136–145)
SODIUM BLD-SCNC: 146 MMOL/L (ref 136–145)
TROPONIN T SERPL-MCNC: <0.01 NG/ML (ref 0–0.03)
WBC MORPH BLD: NORMAL
WBC NRBC COR # BLD: 8.25 10*3/MM3 (ref 4.5–10.7)

## 2017-09-10 PROCEDURE — 25010000002 ONDANSETRON PER 1 MG: Performed by: INTERNAL MEDICINE

## 2017-09-10 PROCEDURE — 85610 PROTHROMBIN TIME: CPT | Performed by: EMERGENCY MEDICINE

## 2017-09-10 PROCEDURE — 80307 DRUG TEST PRSMV CHEM ANLYZR: CPT | Performed by: EMERGENCY MEDICINE

## 2017-09-10 PROCEDURE — 85025 COMPLETE CBC W/AUTO DIFF WBC: CPT | Performed by: EMERGENCY MEDICINE

## 2017-09-10 PROCEDURE — 93005 ELECTROCARDIOGRAM TRACING: CPT | Performed by: EMERGENCY MEDICINE

## 2017-09-10 PROCEDURE — 36415 COLL VENOUS BLD VENIPUNCTURE: CPT

## 2017-09-10 PROCEDURE — 25010000002 THIAMINE PER 100 MG: Performed by: EMERGENCY MEDICINE

## 2017-09-10 PROCEDURE — 25010000002 ONDANSETRON PER 1 MG

## 2017-09-10 PROCEDURE — 85007 BL SMEAR W/DIFF WBC COUNT: CPT | Performed by: EMERGENCY MEDICINE

## 2017-09-10 PROCEDURE — 25810000003 SODIUM CHLORIDE 0.9 % WITH KCL 20 MEQ 20-0.9 MEQ/L-% SOLUTION: Performed by: INTERNAL MEDICINE

## 2017-09-10 PROCEDURE — 25010000002 MAGNESIUM SULFATE PER 500 MG OF MAGNESIUM: Performed by: EMERGENCY MEDICINE

## 2017-09-10 PROCEDURE — 80053 COMPREHEN METABOLIC PANEL: CPT | Performed by: EMERGENCY MEDICINE

## 2017-09-10 PROCEDURE — HZ2ZZZZ DETOXIFICATION SERVICES FOR SUBSTANCE ABUSE TREATMENT: ICD-10-PCS | Performed by: INTERNAL MEDICINE

## 2017-09-10 PROCEDURE — 84484 ASSAY OF TROPONIN QUANT: CPT | Performed by: EMERGENCY MEDICINE

## 2017-09-10 PROCEDURE — 25010000002 LORAZEPAM PER 2 MG: Performed by: EMERGENCY MEDICINE

## 2017-09-10 PROCEDURE — 93010 ELECTROCARDIOGRAM REPORT: CPT | Performed by: INTERNAL MEDICINE

## 2017-09-10 PROCEDURE — 99284 EMERGENCY DEPT VISIT MOD MDM: CPT

## 2017-09-10 PROCEDURE — 25010000002 LORAZEPAM PER 2 MG: Performed by: INTERNAL MEDICINE

## 2017-09-10 PROCEDURE — 25010000002 ENOXAPARIN PER 10 MG: Performed by: INTERNAL MEDICINE

## 2017-09-10 RX ORDER — DIPHENOXYLATE HYDROCHLORIDE AND ATROPINE SULFATE 2.5; .025 MG/1; MG/1
1 TABLET ORAL DAILY
Status: DISCONTINUED | OUTPATIENT
Start: 2017-09-10 | End: 2017-09-13 | Stop reason: HOSPADM

## 2017-09-10 RX ORDER — LORAZEPAM 1 MG/1
2 TABLET ORAL
Status: DISCONTINUED | OUTPATIENT
Start: 2017-09-10 | End: 2017-09-13 | Stop reason: HOSPADM

## 2017-09-10 RX ORDER — LORAZEPAM 2 MG/ML
2 INJECTION INTRAMUSCULAR
Status: DISCONTINUED | OUTPATIENT
Start: 2017-09-10 | End: 2017-09-13 | Stop reason: HOSPADM

## 2017-09-10 RX ORDER — LORAZEPAM 2 MG/ML
1 INJECTION INTRAMUSCULAR
Status: DISCONTINUED | OUTPATIENT
Start: 2017-09-10 | End: 2017-09-13 | Stop reason: HOSPADM

## 2017-09-10 RX ORDER — VENLAFAXINE HYDROCHLORIDE 150 MG/1
150 CAPSULE, EXTENDED RELEASE ORAL DAILY
Status: DISCONTINUED | OUTPATIENT
Start: 2017-09-10 | End: 2017-09-13 | Stop reason: HOSPADM

## 2017-09-10 RX ORDER — SODIUM CHLORIDE AND POTASSIUM CHLORIDE 150; 900 MG/100ML; MG/100ML
100 INJECTION, SOLUTION INTRAVENOUS CONTINUOUS
Status: DISCONTINUED | OUTPATIENT
Start: 2017-09-10 | End: 2017-09-12

## 2017-09-10 RX ORDER — LORAZEPAM 1 MG/1
1 TABLET ORAL
Status: DISCONTINUED | OUTPATIENT
Start: 2017-09-10 | End: 2017-09-13 | Stop reason: HOSPADM

## 2017-09-10 RX ORDER — PANTOPRAZOLE SODIUM 40 MG/1
40 TABLET, DELAYED RELEASE ORAL EVERY MORNING
Status: DISCONTINUED | OUTPATIENT
Start: 2017-09-10 | End: 2017-09-13 | Stop reason: HOSPADM

## 2017-09-10 RX ORDER — THIAMINE MONONITRATE (VIT B1) 100 MG
100 TABLET ORAL DAILY
Status: DISCONTINUED | OUTPATIENT
Start: 2017-09-10 | End: 2017-09-13 | Stop reason: HOSPADM

## 2017-09-10 RX ORDER — ONDANSETRON 2 MG/ML
INJECTION INTRAMUSCULAR; INTRAVENOUS
Status: COMPLETED
Start: 2017-09-10 | End: 2017-09-10

## 2017-09-10 RX ORDER — ONDANSETRON 2 MG/ML
4 INJECTION INTRAMUSCULAR; INTRAVENOUS EVERY 6 HOURS PRN
Status: DISCONTINUED | OUTPATIENT
Start: 2017-09-10 | End: 2017-09-13 | Stop reason: HOSPADM

## 2017-09-10 RX ORDER — METOPROLOL SUCCINATE 50 MG/1
50 TABLET, EXTENDED RELEASE ORAL DAILY
Status: DISCONTINUED | OUTPATIENT
Start: 2017-09-10 | End: 2017-09-13 | Stop reason: HOSPADM

## 2017-09-10 RX ORDER — ONDANSETRON 2 MG/ML
4 INJECTION INTRAMUSCULAR; INTRAVENOUS ONCE
Status: COMPLETED | OUTPATIENT
Start: 2017-09-10 | End: 2017-09-10

## 2017-09-10 RX ORDER — LORAZEPAM 2 MG/ML
0.5 INJECTION INTRAMUSCULAR ONCE
Status: COMPLETED | OUTPATIENT
Start: 2017-09-10 | End: 2017-09-10

## 2017-09-10 RX ADMIN — Medication 1 TABLET: at 17:48

## 2017-09-10 RX ADMIN — POTASSIUM CHLORIDE AND SODIUM CHLORIDE 100 ML/HR: 900; 150 INJECTION, SOLUTION INTRAVENOUS at 15:11

## 2017-09-10 RX ADMIN — LORAZEPAM 2 MG: 2 INJECTION INTRAMUSCULAR; INTRAVENOUS at 23:33

## 2017-09-10 RX ADMIN — ONDANSETRON 4 MG: 2 INJECTION INTRAMUSCULAR; INTRAVENOUS at 23:33

## 2017-09-10 RX ADMIN — VENLAFAXINE HYDROCHLORIDE 150 MG: 150 CAPSULE, EXTENDED RELEASE ORAL at 15:11

## 2017-09-10 RX ADMIN — LORAZEPAM 0.5 MG: 2 INJECTION INTRAMUSCULAR; INTRAVENOUS at 06:07

## 2017-09-10 RX ADMIN — ONDANSETRON 4 MG: 2 INJECTION INTRAMUSCULAR; INTRAVENOUS at 05:48

## 2017-09-10 RX ADMIN — LORAZEPAM 2 MG: 2 INJECTION INTRAMUSCULAR; INTRAVENOUS at 20:20

## 2017-09-10 RX ADMIN — LORAZEPAM 2 MG: 2 INJECTION INTRAMUSCULAR; INTRAVENOUS at 21:28

## 2017-09-10 RX ADMIN — FOLIC ACID 250 ML/HR: 5 INJECTION, SOLUTION INTRAMUSCULAR; INTRAVENOUS; SUBCUTANEOUS at 06:08

## 2017-09-10 RX ADMIN — PANTOPRAZOLE SODIUM 40 MG: 40 TABLET, DELAYED RELEASE ORAL at 15:21

## 2017-09-10 RX ADMIN — LORAZEPAM 2 MG: 2 INJECTION INTRAMUSCULAR; INTRAVENOUS at 19:54

## 2017-09-10 RX ADMIN — ONDANSETRON 4 MG: 2 INJECTION INTRAMUSCULAR; INTRAVENOUS at 06:54

## 2017-09-10 RX ADMIN — ENOXAPARIN SODIUM 40 MG: 40 INJECTION SUBCUTANEOUS at 15:21

## 2017-09-10 RX ADMIN — METOPROLOL SUCCINATE 50 MG: 50 TABLET, FILM COATED, EXTENDED RELEASE ORAL at 15:11

## 2017-09-10 RX ADMIN — LORAZEPAM 2 MG: 2 INJECTION INTRAMUSCULAR; INTRAVENOUS at 15:06

## 2017-09-10 RX ADMIN — Medication 100 MG: at 17:48

## 2017-09-10 RX ADMIN — ONDANSETRON 4 MG: 2 INJECTION INTRAMUSCULAR; INTRAVENOUS at 16:41

## 2017-09-10 RX ADMIN — LORAZEPAM 2 MG: 2 INJECTION INTRAMUSCULAR; INTRAVENOUS at 17:57

## 2017-09-10 RX ADMIN — LORAZEPAM 2 MG: 2 INJECTION INTRAMUSCULAR; INTRAVENOUS at 12:30

## 2017-09-10 NOTE — ED TRIAGE NOTES
ETOH intoxication, headache, SOA.  Brought in by girlfriend.    Binge drinking x 1 week.  Girlfriend thinks he may have had 30 or more beers in the last 24 hours.    Pt was in [facility] for 3 weeks for ETOH abuse.  Discharged 1 week ago.

## 2017-09-10 NOTE — ED PROVIDER NOTES
" EMERGENCY DEPARTMENT ENCOUNTER    CHIEF COMPLAINT  Chief Complaint: alcohol intoxication  History given by: pt  History limited by: nothing  Room Number: 05/05  PMD: Omer Santos MD      HPI:  Pt is a 57 y.o. male who presents complaining of alcohol intoxication for one week. He reports he has been drinking for \"a very long time\". Pt has attempted detox programs previously with various success. Pt admits to nausea.    Duration:  One week  Onset: gradual  Timing: constant  Location: N/A  Radiation: unknown  Quality: intoxication  Intensity/Severity: moderate  Progression: unchanged  Associated Symptoms: nausea  Aggravating Factors: unknown  Alleviating Factors: unknown  Previous Episodes: Pt has been drinking for \"a very long time\"  Treatment before arrival: unknown    PAST MEDICAL HISTORY  Active Ambulatory Problems     Diagnosis Date Noted   • Alcohol withdrawal 08/06/2016   • Benign essential HTN 08/06/2016   • Alcohol abuse 08/06/2016   • History of seizure 08/06/2016   • Alcoholic cirrhosis 08/11/2016     Resolved Ambulatory Problems     Diagnosis Date Noted   • No Resolved Ambulatory Problems     Past Medical History:   Diagnosis Date   • Alcohol abuse    • Alcohol withdrawal seizure    • Alcoholic cirrhosis 8/11/2016   • Alcoholism    • Depression    • Hypertension        PAST SURGICAL HISTORY  History reviewed. No pertinent surgical history.    FAMILY HISTORY  Family History   Problem Relation Age of Onset   • Cancer Mother    • Depression Maternal Aunt        SOCIAL HISTORY  Social History     Social History   • Marital status:      Spouse name: N/A   • Number of children: N/A   • Years of education: N/A     Occupational History   • Not on file.     Social History Main Topics   • Smoking status: Never Smoker   • Smokeless tobacco: Not on file   • Alcohol use Yes      Comment: approx 30 + beers in last 24 hours 0.5 to 1 gallon of vodka daily last 6 months prior 1 gallon/wk  daily 7 months   • " Drug use: Yes   • Sexual activity: No     Other Topics Concern   • Not on file     Social History Narrative   • No narrative on file       ALLERGIES  Review of patient's allergies indicates no known allergies.    REVIEW OF SYSTEMS  Review of Systems   Constitutional: Negative for activity change, appetite change and fever.        Alcohol intoxication   HENT: Negative for congestion and sore throat.    Eyes: Negative.    Respiratory: Negative for cough and shortness of breath.    Cardiovascular: Negative for chest pain and leg swelling.   Gastrointestinal: Positive for nausea. Negative for abdominal pain, diarrhea and vomiting.   Endocrine: Negative.    Genitourinary: Negative for decreased urine volume and dysuria.   Musculoskeletal: Negative for neck pain.   Skin: Negative for rash and wound.   Allergic/Immunologic: Negative.    Neurological: Negative for weakness, numbness and headaches.   Hematological: Negative.    Psychiatric/Behavioral: Negative.    All other systems reviewed and are negative.      PHYSICAL EXAM  ED Triage Vitals   Temp Heart Rate Resp BP SpO2   09/10/17 0402 09/10/17 0402 09/10/17 0402 09/10/17 0405 09/10/17 0402   97.4 °F (36.3 °C) 81 18 148/82 96 %      Temp src Heart Rate Source Patient Position BP Location FiO2 (%)   09/10/17 0402 -- -- -- --   Tympanic           Physical Exam   Constitutional: He is oriented to person, place, and time and well-developed, well-nourished, and in no distress. He appears toxic.   HENT:   Head: Normocephalic and atraumatic.   Eyes: EOM are normal. Pupils are equal, round, and reactive to light.   Neck: Normal range of motion. Neck supple.   Cardiovascular: Normal rate, regular rhythm and normal heart sounds.    Pulmonary/Chest: Effort normal and breath sounds normal. No respiratory distress.   Abdominal: Soft. There is no tenderness. There is no rebound and no guarding.   Musculoskeletal: Normal range of motion. He exhibits no edema.   Neurological: He is  alert and oriented to person, place, and time. He has normal sensation and normal strength. He displays abnormal speech (slurred).   Skin: Skin is warm and dry.   Psychiatric: Mood and affect normal.   Nursing note and vitals reviewed.      LAB RESULTS  Lab Results (last 24 hours)     Procedure Component Value Units Date/Time    CBC & Differential [[id number]] Collected:  09/10/17 0432    Specimen:  Blood Updated:  09/10/17 0533    Narrative:       The following orders were created for panel order CBC & Differential.  Procedure                               Abnormality         Status                     ---------                               -----------         ------                     Scan Slide[[id number]]                                       Final result               CBC Auto Differential[[id number]]        Abnormal            Final result                 Please view results for these tests on the individual orders.    Comprehensive Metabolic Panel [[id number]]  (Abnormal) Collected:  09/10/17 0432    Specimen:  Blood Updated:  09/10/17 0505     Glucose 117 (H) mg/dL      BUN 4 (L) mg/dL      Creatinine 0.79 mg/dL      Sodium 146 (H) mmol/L      Potassium 3.5 mmol/L      Chloride 102 mmol/L      CO2 23.3 mmol/L      Calcium 8.9 mg/dL      Total Protein 8.7 (H) g/dL      Albumin 4.50 g/dL      ALT (SGPT) 55 (H) U/L      AST (SGOT) 95 (H) U/L      Alkaline Phosphatase 82 U/L      Total Bilirubin 0.4 mg/dL      eGFR Non African Amer 101 mL/min/1.73      Globulin 4.2 gm/dL      A/G Ratio 1.1 g/dL      BUN/Creatinine Ratio 5.1 (L)     Anion Gap 20.7 mmol/L     Protime-INR [[id number]]  (Normal) Collected:  09/10/17 0432    Specimen:  Blood Updated:  09/10/17 0500     Protime 13.6 Seconds      INR 1.08    Ethanol [[id number]]  (Abnormal) Collected:  09/10/17 0432    Specimen:  Blood Updated:  09/10/17 0515     Ethanol 417 (C) mg/dL      Ethanol % 0.417 %     CBC Auto Differential [[id number]]  (Abnormal) Collected:   09/10/17 0432    Specimen:  Blood Updated:  09/10/17 0533     WBC 8.25 10*3/mm3      RBC 4.60 10*6/mm3      Hemoglobin 14.2 g/dL      Hematocrit 41.9 %      MCV 91.1 fL      MCH 30.9 pg      MCHC 33.9 g/dL      RDW 14.6 (H) %      RDW-SD 47.9 fl      MPV 9.9 fL      Platelets 156 10*3/mm3      Neutrophil % 24.1 (L) %      Lymphocyte % 66.9 (H) %      Monocyte % 8.4 %      Eosinophil % 0.4 %      Basophil % 0.2 %      Immature Grans % 0.0 %      Neutrophils, Absolute 1.99 10*3/mm3      Lymphocytes, Absolute 5.52 (H) 10*3/mm3      Monocytes, Absolute 0.69 10*3/mm3      Eosinophils, Absolute 0.03 10*3/mm3      Basophils, Absolute 0.02 10*3/mm3      Immature Grans, Absolute 0.00 10*3/mm3      nRBC 0.0 /100 WBC     Scan Slide [[id number]] Collected:  09/10/17 0432    Specimen:  Blood Updated:  09/10/17 0533     Polychromasia Slight/1+     WBC Morphology Normal     Clumped Platelets Present          I ordered the above labs and reviewed the results    PROCEDURES  Procedures    EKG           EKG time: 0632  Rhythm/Rate: a fib with rapid ventricular response  P waves and PA: normal  QRS, axis: normal   ST and T waves: normal     Interpreted Contemporaneously by me, independently viewed  changed compared to prior 10/11/2016     PROGRESS AND CONSULTS  ED Course     0430 - Ordered labs for evaluation.    0519 - Placed Psych consult. Ordered multiple vitamin infusion.    0556 - Ordered Ativan for agitation.    0559 - Ordered Zofran for nausea.    0620 - Ordered EKG for further evaluation.    0634 - Ordered Cardizem for rapid a-fib.    0638 - Placed call to [facility] for admission.    0640 - Discussed pt's case with Dr. Stanley ([facility]), who agreed to admit the pt.    MEDICAL DECISION MAKING  Results were reviewed/discussed with the patient and they were also made aware of online access. Pt also made aware that some labs, such as cultures, will not be resulted during ER visit and follow up with PMD is necessary.     MDM  Number of  Diagnoses or Management Options     Amount and/or Complexity of Data Reviewed  Clinical lab tests: ordered and reviewed (Ethanol - 417)  Tests in the medicine section of CPT®: ordered and reviewed (See EKG procedure note)  Discuss the patient with other providers: yes (Dr. Stanley ([facility]))           DIAGNOSIS  Final diagnoses:   Atrial fibrillation with RVR   Alcohol abuse   Alcohol intoxication, with unspecified complication       DISPOSITION  ADMISSION    Discussed treatment plan and reason for admission with pt/family and admitting physician.  Pt/family voiced understanding of the plan for admission for further testing/treatment as needed.       Latest Documented Vital Signs:  As of 6:41 AM  BP- (!) 133/109 HR- (!) 156 Temp- 97.4 °F (36.3 °C) (Tympanic) O2 sat- 97%    --  Documentation assistance provided by andrew Fritz for Dr. Burrell.  Information recorded by the scribe was done at my direction and has been verified and validated by me.     Tristian Fritz  09/10/17 0642       Anastacio Burrell MD  09/10/17 0710

## 2017-09-10 NOTE — PLAN OF CARE
Problem: Patient Care Overview (Adult)  Goal: Plan of Care Review  Outcome: Ongoing (interventions implemented as appropriate)    09/10/17 1638   Coping/Psychosocial Response Interventions   Plan Of Care Reviewed With patient   Patient Care Overview   Progress no change   Outcome Evaluation   Outcome Summary/Follow up Plan Adittted to the floor. CIWA protocol followed. Vitals stable. Will continue to monitor.         Problem: Acute Alcohol Withdrawal Syndrome, Risk For/Actual (Adult)  Goal: Signs and Symptoms of Listed Potential Problems Will be Absent or Manageable (Acute Alcohol Withdrawal Syndrome, Risk For/Actual)  Outcome: Ongoing (interventions implemented as appropriate)  Goal: Signs and Symptoms of Listed Potential Problems Will be Absent or Manageable (Acute Alcohol Withdrawal Syndrome, Risk For/Actual)  Outcome: Ongoing (interventions implemented as appropriate)    Problem: Pulmonary Hypertension, Persistent ([place],NICU)  Goal: Signs and Symptoms of Listed Potential Problems Will be Absent or Manageable (Pulmonary Hypertension, Persistent)  Outcome: Ongoing (interventions implemented as appropriate)    Problem: Seizure Disorder/Epilepsy (Adult)  Goal: Signs and Symptoms of Listed Potential Problems Will be Absent or Manageable (Seizure Disorder/Epilepsy)  Outcome: Ongoing (interventions implemented as appropriate)    Problem: Fall Risk (Adult)  Goal: Absence of Falls  Outcome: Ongoing (interventions implemented as appropriate)

## 2017-09-10 NOTE — H&P
Name: Ton Edwards ADMIT: 9/10/2017   : 1960  PCP: Omer Santos MD    MRN: [id number] LOS: 0 days   AGE/SEX: 57 y.o. male  ROOM: [facility]/     Chief Complaint   Patient presents with   • Alcohol Intoxication       History of Present Illness  57-year-old male with a long history of alcohol abuse who presented to the emergency room complaining of alcohol intoxication.  His blood alcohol level was 417 and that was at 4:30 AM.  A repeat level at 11:15 was down to 282.  He had received Ativan and when I saw him he could not wake up at all.  His nurse told me that he did wake up after I had been there and that he was hallucinating and seeing bugs and was extremely agitated.  He apparently has a previous history of alcohol withdrawal seizure and has had previous alcohol withdrawal with DTs.  When he arrived in the ER he was apparently in atrial fibrillation with rapid ventricular response.  Diltiazem was ordered but he spontaneously converted before it was given.      Past Medical History:   Diagnosis Date   • Alcohol abuse    • Alcohol withdrawal seizure    • Alcoholic cirrhosis 2016    Seen on CT- had not had biopsy    • Alcoholism    • Depression    • Hypertension      History reviewed. No pertinent surgical history.       Family History   Problem Relation Age of Onset   • Cancer Mother    • Depression Maternal Aunt        Social History   Substance Use Topics   • Smoking status: Never Smoker   • Smokeless tobacco: None   • Alcohol use Yes      Comment: approx 30 + beers in last 24 hours 0.5 to 1 gallon of vodka daily last 6 months prior 1 gallon/wk  daily 7 months       Prescriptions Prior to Admission   Medication Sig Dispense Refill Last Dose   • metoprolol succinate XL (TOPROL XL) 50 MG 24 hr tablet Take 1 tablet by mouth daily. 30 tablet 0 2017   • omeprazole (PriLOSEC) 40 MG capsule Take 1 capsule by mouth daily. 30 capsule 0 2017   • venlafaxine XR (EFFEXOR-XR) 150 MG 24 hr capsule  Take 150 mg by mouth daily.   9/8/2017     Allergies:  Review of patient's allergies indicates no known allergies.    Review of Systems   Unobtainable       Objective    Vital Signs  Temp:  [97.4 °F (36.3 °C)-97.9 °F (36.6 °C)] 97.9 °F (36.6 °C)  Heart Rate:  [] 105  Resp:  [18-22] 22  BP: (102-148)/() 138/74  SpO2:  [87 %-97 %] 96 %  on  Flow (L/min):  [2] 2;   O2 Device: nasal cannula  Body mass index is 32.05 kg/(m^2).    Physical Exam  Disheveled but well-developed well-nourished male in no acute distress  I was able to pull his eyelids open and he didn't respond whatsoever. PERRL, EOMI, sclera nonicteric.  I could not examine his oropharynx.  Neck is supple  Lungs appeared clear but with upper airway noise and snoring  Heart mildly tachycardic but regular   Abdomen obese, soft, bowel sounds present throughout.  No guarding  Extremities no edema      Results Review:   I reviewed the patient's new clinical results.    Results from last 7 days  Lab Units 09/10/17  0432   WBC 10*3/mm3 8.25   HEMOGLOBIN g/dL 14.2   PLATELETS 10*3/mm3 156       Results from last 7 days  Lab Units 09/10/17  0432   SODIUM mmol/L 146*   POTASSIUM mmol/L 3.5   CHLORIDE mmol/L 102   CO2 mmol/L 23.3   BUN mg/dL 4*   CREATININE mg/dL 0.79   GLUCOSE mg/dL 117*   ALBUMIN g/dL 4.50   BILIRUBIN mg/dL 0.4   ALK PHOS U/L 82   AST (SGOT) U/L 95*   ALT (SGPT) U/L 55*   Estimated Creatinine Clearance: 119.4 mL/min (by C-G formula based on Cr of 0.79).    Results from last 7 days  Lab Units 09/10/17  0432   INR  1.08   TROPONIN T ng/mL <0.010           No orders to display     Assessment/Plan   Assessment:     Active Hospital Problems (** Indicates Principal Problem)    Diagnosis Date Noted   • Atrial fibrillation with RVR [I48.91] 09/10/2017   • Lymphocytosis [D72.820] 09/10/2017   • DTs (delirium tremens) [F10.231] 09/10/2017   • Alcoholic cirrhosis [K70.30] 08/11/2016   • Alcohol abuse [F10.10] 08/06/2016   • History of seizure  [Z87.898] 08/06/2016   • Benign essential HTN [I10] 08/06/2016      Resolved Hospital Problems    Diagnosis Date Noted Date Resolved   No resolved problems to display.       Plan:     At this point he is in full blown DTs and that was with his alcohol level still being extremely elevated at 282.  He's been placed on detox protocol.    He is already on Toprol-XL.  He was admitted to [facility] in July of this year and this was not listed on his home medications at that time.  He also was not discharged on it.  I am assuming this is his first episode of atrial fibrillation.  I am sure it is related to his alcohol intoxication and alcohol abuse.  I have not consulted cardiology since he spontaneously converted back to sinus rhythm.  He is not a candidate for anticoagulation given his long-standing alcohol abuse.  It would also be a high risk to put him on any type of antiarrhythmic medication so rate control with Toprol-XL is the only option presently.  [facility] has been asked to see the patient although at this point they will not really be able to be of any benefit.  His hallucinations and agitation are currently being controlled with the Ativan.  He is on a telemetry unit and being monitored closely.           Ana Bishop MD  [facility]  09/10/17  12:58 PM

## 2017-09-11 ENCOUNTER — APPOINTMENT (OUTPATIENT)
Dept: CT IMAGING | Facility: HOSPITAL | Age: 57
End: 2017-09-11

## 2017-09-11 PROBLEM — D69.6 THROMBOCYTOPENIA (HCC): Status: ACTIVE | Noted: 2017-09-11

## 2017-09-11 PROBLEM — D61.818 PANCYTOPENIA (HCC): Status: ACTIVE | Noted: 2017-09-11

## 2017-09-11 PROBLEM — R10.9 ABDOMINAL PAIN: Status: ACTIVE | Noted: 2017-09-11

## 2017-09-11 LAB
ALBUMIN SERPL-MCNC: 3.7 G/DL (ref 3.5–5.2)
ALBUMIN/GLOB SERPL: 1 G/DL
ALP SERPL-CCNC: 66 U/L (ref 39–117)
ALT SERPL W P-5'-P-CCNC: 43 U/L (ref 1–41)
ANION GAP SERPL CALCULATED.3IONS-SCNC: 11.7 MMOL/L
AST SERPL-CCNC: 72 U/L (ref 1–40)
BASOPHILS # BLD AUTO: 0.01 10*3/MM3 (ref 0–0.2)
BASOPHILS NFR BLD AUTO: 0.3 % (ref 0–1.5)
BILIRUB SERPL-MCNC: 0.8 MG/DL (ref 0.1–1.2)
BUN BLD-MCNC: 6 MG/DL (ref 6–20)
BUN/CREAT SERPL: 7.5 (ref 7–25)
CALCIUM SPEC-SCNC: 8.4 MG/DL (ref 8.6–10.5)
CHLORIDE SERPL-SCNC: 102 MMOL/L (ref 98–107)
CO2 SERPL-SCNC: 26.3 MMOL/L (ref 22–29)
CREAT BLD-MCNC: 0.8 MG/DL (ref 0.76–1.27)
DEPRECATED RDW RBC AUTO: 48.7 FL (ref 37–54)
DEPRECATED RDW RBC AUTO: 49 FL (ref 37–54)
EOSINOPHIL # BLD AUTO: 0.03 10*3/MM3 (ref 0–0.7)
EOSINOPHIL NFR BLD AUTO: 0.8 % (ref 0.3–6.2)
ERYTHROCYTE [DISTWIDTH] IN BLOOD BY AUTOMATED COUNT: 14 % (ref 11.5–14.5)
ERYTHROCYTE [DISTWIDTH] IN BLOOD BY AUTOMATED COUNT: 14.2 % (ref 11.5–14.5)
GFR SERPL CREATININE-BSD FRML MDRD: 100 ML/MIN/1.73
GLOBULIN UR ELPH-MCNC: 3.8 GM/DL
GLUCOSE BLD-MCNC: 116 MG/DL (ref 65–99)
HCT VFR BLD AUTO: 37.4 % (ref 40.4–52.2)
HCT VFR BLD AUTO: 40.2 % (ref 40.4–52.2)
HGB BLD-MCNC: 12.1 G/DL (ref 13.7–17.6)
HGB BLD-MCNC: 12.9 G/DL (ref 13.7–17.6)
IMM GRANULOCYTES # BLD: 0 10*3/MM3 (ref 0–0.03)
IMM GRANULOCYTES NFR BLD: 0 % (ref 0–0.5)
LIPASE SERPL-CCNC: 92 U/L (ref 13–60)
LYMPHOCYTES # BLD AUTO: 1.82 10*3/MM3 (ref 0.9–4.8)
LYMPHOCYTES NFR BLD AUTO: 49.2 % (ref 19.6–45.3)
MAGNESIUM SERPL-MCNC: 1.9 MG/DL (ref 1.6–2.6)
MCH RBC QN AUTO: 30.4 PG (ref 27–32.7)
MCH RBC QN AUTO: 30.6 PG (ref 27–32.7)
MCHC RBC AUTO-ENTMCNC: 32.1 G/DL (ref 32.6–36.4)
MCHC RBC AUTO-ENTMCNC: 32.4 G/DL (ref 32.6–36.4)
MCV RBC AUTO: 94 FL (ref 79.8–96.2)
MCV RBC AUTO: 95.5 FL (ref 79.8–96.2)
MONOCYTES # BLD AUTO: 0.31 10*3/MM3 (ref 0.2–1.2)
MONOCYTES NFR BLD AUTO: 8.4 % (ref 5–12)
NEUTROPHILS # BLD AUTO: 1.53 10*3/MM3 (ref 1.9–8.1)
NEUTROPHILS NFR BLD AUTO: 41.3 % (ref 42.7–76)
PLATELET # BLD AUTO: 71 10*3/MM3 (ref 140–500)
PLATELET # BLD AUTO: 81 10*3/MM3 (ref 140–500)
PMV BLD AUTO: 10 FL (ref 6–12)
PMV BLD AUTO: 10.4 FL (ref 6–12)
POTASSIUM BLD-SCNC: 3.7 MMOL/L (ref 3.5–5.2)
PROT SERPL-MCNC: 7.5 G/DL (ref 6–8.5)
RBC # BLD AUTO: 3.98 10*6/MM3 (ref 4.6–6)
RBC # BLD AUTO: 4.21 10*6/MM3 (ref 4.6–6)
SODIUM BLD-SCNC: 140 MMOL/L (ref 136–145)
WBC NRBC COR # BLD: 3.7 10*3/MM3 (ref 4.5–10.7)
WBC NRBC COR # BLD: 4.28 10*3/MM3 (ref 4.5–10.7)

## 2017-09-11 PROCEDURE — 25010000002 ONDANSETRON PER 1 MG: Performed by: INTERNAL MEDICINE

## 2017-09-11 PROCEDURE — 25010000002 THIAMINE PER 100 MG: Performed by: INTERNAL MEDICINE

## 2017-09-11 PROCEDURE — 25010000002 ENOXAPARIN PER 10 MG: Performed by: INTERNAL MEDICINE

## 2017-09-11 PROCEDURE — 90791 PSYCH DIAGNOSTIC EVALUATION: CPT | Performed by: SOCIAL WORKER

## 2017-09-11 PROCEDURE — 0 IOPAMIDOL 61 % SOLUTION: Performed by: INTERNAL MEDICINE

## 2017-09-11 PROCEDURE — 83735 ASSAY OF MAGNESIUM: CPT | Performed by: INTERNAL MEDICINE

## 2017-09-11 PROCEDURE — 85027 COMPLETE CBC AUTOMATED: CPT | Performed by: INTERNAL MEDICINE

## 2017-09-11 PROCEDURE — 74177 CT ABD & PELVIS W/CONTRAST: CPT

## 2017-09-11 PROCEDURE — 25810000003 SODIUM CHLORIDE 0.9 % WITH KCL 20 MEQ 20-0.9 MEQ/L-% SOLUTION: Performed by: INTERNAL MEDICINE

## 2017-09-11 PROCEDURE — 80053 COMPREHEN METABOLIC PANEL: CPT | Performed by: INTERNAL MEDICINE

## 2017-09-11 PROCEDURE — 83690 ASSAY OF LIPASE: CPT | Performed by: INTERNAL MEDICINE

## 2017-09-11 PROCEDURE — 85025 COMPLETE CBC W/AUTO DIFF WBC: CPT | Performed by: INTERNAL MEDICINE

## 2017-09-11 PROCEDURE — 25010000002 LORAZEPAM PER 2 MG: Performed by: INTERNAL MEDICINE

## 2017-09-11 PROCEDURE — 25010000002 MAGNESIUM SULFATE PER 500 MG OF MAGNESIUM: Performed by: INTERNAL MEDICINE

## 2017-09-11 RX ADMIN — LORAZEPAM 2 MG: 2 INJECTION INTRAMUSCULAR; INTRAVENOUS at 01:34

## 2017-09-11 RX ADMIN — ONDANSETRON 4 MG: 2 INJECTION INTRAMUSCULAR; INTRAVENOUS at 10:52

## 2017-09-11 RX ADMIN — ENOXAPARIN SODIUM 40 MG: 40 INJECTION SUBCUTANEOUS at 09:49

## 2017-09-11 RX ADMIN — IOPAMIDOL 85 ML: 612 INJECTION, SOLUTION INTRAVENOUS at 22:30

## 2017-09-11 RX ADMIN — LORAZEPAM 2 MG: 2 INJECTION INTRAMUSCULAR; INTRAVENOUS at 06:13

## 2017-09-11 RX ADMIN — METOPROLOL SUCCINATE 50 MG: 50 TABLET, FILM COATED, EXTENDED RELEASE ORAL at 09:49

## 2017-09-11 RX ADMIN — PANTOPRAZOLE SODIUM 40 MG: 40 TABLET, DELAYED RELEASE ORAL at 06:12

## 2017-09-11 RX ADMIN — POTASSIUM CHLORIDE AND SODIUM CHLORIDE 100 ML/HR: 900; 150 INJECTION, SOLUTION INTRAVENOUS at 01:34

## 2017-09-11 RX ADMIN — MAGNESIUM SULFATE HEPTAHYDRATE 100 ML/HR: 500 INJECTION, SOLUTION INTRAMUSCULAR; INTRAVENOUS at 09:50

## 2017-09-11 RX ADMIN — LORAZEPAM 2 MG: 2 INJECTION INTRAMUSCULAR; INTRAVENOUS at 23:58

## 2017-09-11 RX ADMIN — LORAZEPAM 2 MG: 2 INJECTION INTRAMUSCULAR; INTRAVENOUS at 04:05

## 2017-09-11 RX ADMIN — Medication 100 MG: at 09:49

## 2017-09-11 RX ADMIN — VENLAFAXINE HYDROCHLORIDE 150 MG: 150 CAPSULE, EXTENDED RELEASE ORAL at 09:49

## 2017-09-11 RX ADMIN — LORAZEPAM 2 MG: 2 INJECTION INTRAMUSCULAR; INTRAVENOUS at 10:52

## 2017-09-11 RX ADMIN — Medication 1 TABLET: at 09:49

## 2017-09-11 RX ADMIN — ONDANSETRON 4 MG: 2 INJECTION INTRAMUSCULAR; INTRAVENOUS at 23:58

## 2017-09-11 RX ADMIN — LORAZEPAM 2 MG: 2 INJECTION INTRAMUSCULAR; INTRAVENOUS at 19:47

## 2017-09-11 RX ADMIN — ONDANSETRON 4 MG: 2 INJECTION INTRAMUSCULAR; INTRAVENOUS at 04:05

## 2017-09-11 RX ADMIN — LORAZEPAM 1 MG: 2 INJECTION INTRAMUSCULAR; INTRAVENOUS at 13:58

## 2017-09-11 RX ADMIN — POTASSIUM CHLORIDE AND SODIUM CHLORIDE 100 ML/HR: 900; 150 INJECTION, SOLUTION INTRAVENOUS at 16:56

## 2017-09-11 RX ADMIN — ONDANSETRON 4 MG: 2 INJECTION INTRAMUSCULAR; INTRAVENOUS at 16:56

## 2017-09-11 NOTE — CONSULTS
"[facility] Assessment.  Pt is a 58 yo white single make.  Pt in rm alone and was pleasant and cooperative with interview.  He was admitted for etoh detox and by history has had a seizure in the past when detoxing.  No apparent street drug use.  Beer is his drink of choice.  Upon arrival yesterday his ethanol level was 417.  Reportedly pt had 30 beers in 24 hours.  Pt is A&Ox4.  No SI/HI.  Pt was hallucinating with his withdrawals a day ago but denies any psychosis otherwise.  Pt reports he has been in an Intensive Outpatient Substance Use program at [facility].  He was clean for 6 wks and relapsed over this past weekend when girlfriend went out of town.  He wants to return to the program once he is released from the hospital.  He knows who to call to get re-evaluated to return to the program.  Pt has a sponsor from  who is supportive and the pt has been in touch with him.  He has a home group and says his girlfriend is very supportive as well.  Pt states he understands that he needs to quit and is aware of all the physical issues he has due to his drinking.  Pt rated his cravings at a \"0\" right now and he rated his depression a \"7\" out of 10 with 10 being worst.  Pt states he can get his antidepressant meds adjusted through his PCP who is involved in the OP program at [facility].  No further services were requested.  AC will continue to follow.  "

## 2017-09-11 NOTE — PROGRESS NOTES
LOS: 1 day   Patient Care Team:  Omer Santos MD as PCP - General (Family Medicine)    Chief Complaint   Patient presents with   • Alcohol Intoxication         Subjective    Much better today according to staff & family. Has now developed periumbilical abd pain with nausea. Worse with eating. Has previous h/o pancreatitis     Objective     Vital Signs  Temp:  [97.6 °F (36.4 °C)-98.9 °F (37.2 °C)] 98.9 °F (37.2 °C)  Heart Rate:  [70-81] 70  Resp:  [18-20] 18  BP: (133-163)/(69-88) 163/85    Physical Exam:  Obese WM in  NAD  Skin warm & dry  Lungs clear but BS decreased throughout  Heart RRR  Abd obese, soft, diffusely tender  Ext no edema     Results Review:     I reviewed the patient's new clinical results.    Medication Review:       LABS    Results from last 7 days  Lab Units 09/11/17  0625 09/10/17  1636 09/10/17  0432   SODIUM mmol/L 140 145 146*   POTASSIUM mmol/L 3.7 4.0 3.5   CHLORIDE mmol/L 102 104 102   CO2 mmol/L 26.3 26.1 23.3   BUN mg/dL 6 4* 4*   CREATININE mg/dL 0.80 0.71* 0.79   GLUCOSE mg/dL 116* 104* 117*   CALCIUM mg/dL 8.4* 7.7* 8.9       Results from last 7 days  Lab Units 09/11/17  0625 09/10/17  0432   WBC 10*3/mm3 3.70* 8.25   HEMOGLOBIN g/dL 12.1* 14.2   HEMATOCRIT % 37.4* 41.9   PLATELETS 10*3/mm3 81* 156       Results from last 7 days  Lab Units 09/10/17  0432   INR  1.08           Assessment/Plan     Active Problems:    Benign essential HTN    Alcohol abuse    History of seizure    Alcoholic cirrhosis    Atrial fibrillation with RVR    Lymphocytosis    DTs (delirium tremens)    Abdominal pain - will check lipase & CT abd. Make NPO for now    Thrombocytopenia - This has been present previously and is likely related to his alcohol abuse, but I did go ahead and discontinue the Lovenox.    Pancytopenia          Ana Bishop MD  09/11/17  7:50 PM      Time:

## 2017-09-11 NOTE — PLAN OF CARE
Problem: Patient Care Overview (Adult)  Goal: Plan of Care Review  Outcome: Ongoing (interventions implemented as appropriate)    17 0412   Coping/Psychosocial Response Interventions   Plan Of Care Reviewed With patient;friend   Patient Care Overview   Progress no change   Outcome Evaluation   Outcome Summary/Follow up Plan VSS, A&ox4. Pt continues to c/o tremors, diaphoresis, n/v, HA, visual hallucinations.Treatred per [facility] protocol. Will continue to monitor vitals closely because of need for Ativan.          Problem: Acute Alcohol Withdrawal Syndrome, Risk For/Actual (Adult)  Goal: Signs and Symptoms of Listed Potential Problems Will be Absent or Manageable (Acute Alcohol Withdrawal Syndrome, Risk For/Actual)  Outcome: Ongoing (interventions implemented as appropriate)  Goal: Signs and Symptoms of Listed Potential Problems Will be Absent or Manageable (Acute Alcohol Withdrawal Syndrome, Risk For/Actual)  Outcome: Ongoing (interventions implemented as appropriate)    Problem: Pulmonary Hypertension, Persistent (,NICU)  Goal: Signs and Symptoms of Listed Potential Problems Will be Absent or Manageable (Pulmonary Hypertension, Persistent)  Outcome: Ongoing (interventions implemented as appropriate)    Problem: Seizure Disorder/Epilepsy (Adult)  Goal: Signs and Symptoms of Listed Potential Problems Will be Absent or Manageable (Seizure Disorder/Epilepsy)  Outcome: Ongoing (interventions implemented as appropriate)    Problem: Fall Risk (Adult)  Goal: Absence of Falls  Outcome: Ongoing (interventions implemented as appropriate)

## 2017-09-11 NOTE — PLAN OF CARE
Problem: Patient Care Overview (Adult)  Goal: Plan of Care Review  Outcome: Ongoing (interventions implemented as appropriate)    17 3151   Outcome Evaluation   Outcome Summary/Follow up Plan CIWA <8, able to decrease frequency to Q4 hours. Pleasant, cooperative. Mild nausea and headache persist, but patient able to tolerate dinner without problems. No s/sx seizure activity.       Goal: Adult Individualization and Mutuality  Outcome: Ongoing (interventions implemented as appropriate)  Goal: Discharge Needs Assessment  Outcome: Ongoing (interventions implemented as appropriate)    Problem: Acute Alcohol Withdrawal Syndrome, Risk For/Actual (Adult)  Goal: Signs and Symptoms of Listed Potential Problems Will be Absent or Manageable (Acute Alcohol Withdrawal Syndrome, Risk For/Actual)  Outcome: Ongoing (interventions implemented as appropriate)  Goal: Signs and Symptoms of Listed Potential Problems Will be Absent or Manageable (Acute Alcohol Withdrawal Syndrome, Risk For/Actual)  Outcome: Ongoing (interventions implemented as appropriate)    Problem: Pulmonary Hypertension, Persistent ([place],NICU)  Goal: Signs and Symptoms of Listed Potential Problems Will be Absent or Manageable (Pulmonary Hypertension, Persistent)  Outcome: Ongoing (interventions implemented as appropriate)    Problem: Seizure Disorder/Epilepsy (Adult)  Goal: Signs and Symptoms of Listed Potential Problems Will be Absent or Manageable (Seizure Disorder/Epilepsy)  Outcome: Ongoing (interventions implemented as appropriate)    Problem: Fall Risk (Adult)  Goal: Identify Related Risk Factors and Signs and Symptoms  Outcome: Ongoing (interventions implemented as appropriate)  Goal: Absence of Falls  Outcome: Ongoing (interventions implemented as appropriate)

## 2017-09-11 NOTE — NURSING NOTE
Pt monitored per protocol for alcohol withdrawal. Received Ativan recently. Will return pt able to think clearly to do evaluation.

## 2017-09-12 PROBLEM — I48.91 ATRIAL FIBRILLATION WITH RVR (HCC): Status: RESOLVED | Noted: 2017-09-10 | Resolved: 2017-09-12

## 2017-09-12 PROBLEM — R10.9 ABDOMINAL PAIN: Status: RESOLVED | Noted: 2017-09-11 | Resolved: 2017-09-12

## 2017-09-12 LAB
ALBUMIN SERPL-MCNC: 3.4 G/DL (ref 3.5–5.2)
ALBUMIN/GLOB SERPL: 0.9 G/DL
ALP SERPL-CCNC: 64 U/L (ref 39–117)
ALT SERPL W P-5'-P-CCNC: 34 U/L (ref 1–41)
ANION GAP SERPL CALCULATED.3IONS-SCNC: 12.8 MMOL/L
AST SERPL-CCNC: 57 U/L (ref 1–40)
BASOPHILS # BLD AUTO: 0.03 10*3/MM3 (ref 0–0.2)
BASOPHILS NFR BLD AUTO: 0.7 % (ref 0–1.5)
BILIRUB SERPL-MCNC: 1 MG/DL (ref 0.1–1.2)
BUN BLD-MCNC: 6 MG/DL (ref 6–20)
BUN/CREAT SERPL: 8.6 (ref 7–25)
CALCIUM SPEC-SCNC: 8.1 MG/DL (ref 8.6–10.5)
CHLORIDE SERPL-SCNC: 103 MMOL/L (ref 98–107)
CO2 SERPL-SCNC: 20.2 MMOL/L (ref 22–29)
CREAT BLD-MCNC: 0.7 MG/DL (ref 0.76–1.27)
DACRYOCYTES BLD QL SMEAR: NORMAL
DEPRECATED RDW RBC AUTO: 48.5 FL (ref 37–54)
EOSINOPHIL # BLD AUTO: 0.09 10*3/MM3 (ref 0–0.7)
EOSINOPHIL NFR BLD AUTO: 2.1 % (ref 0.3–6.2)
ERYTHROCYTE [DISTWIDTH] IN BLOOD BY AUTOMATED COUNT: 14.2 % (ref 11.5–14.5)
GFR SERPL CREATININE-BSD FRML MDRD: 116 ML/MIN/1.73
GLOBULIN UR ELPH-MCNC: 3.9 GM/DL
GLUCOSE BLD-MCNC: 96 MG/DL (ref 65–99)
HCT VFR BLD AUTO: 39.5 % (ref 40.4–52.2)
HGB BLD-MCNC: 12.8 G/DL (ref 13.7–17.6)
IMM GRANULOCYTES # BLD: 0.06 10*3/MM3 (ref 0–0.03)
IMM GRANULOCYTES NFR BLD: 1.4 % (ref 0–0.5)
LIPASE SERPL-CCNC: 48 U/L (ref 13–60)
LYMPHOCYTES # BLD AUTO: 1.97 10*3/MM3 (ref 0.9–4.8)
LYMPHOCYTES NFR BLD AUTO: 47 % (ref 19.6–45.3)
MCH RBC QN AUTO: 30.3 PG (ref 27–32.7)
MCHC RBC AUTO-ENTMCNC: 32.4 G/DL (ref 32.6–36.4)
MCV RBC AUTO: 93.6 FL (ref 79.8–96.2)
MONOCYTES # BLD AUTO: 0.33 10*3/MM3 (ref 0.2–1.2)
MONOCYTES NFR BLD AUTO: 7.9 % (ref 5–12)
NEUTROPHILS # BLD AUTO: 1.71 10*3/MM3 (ref 1.9–8.1)
NEUTROPHILS NFR BLD AUTO: 40.9 % (ref 42.7–76)
NRBC BLD MANUAL-RTO: 0 /100 WBC (ref 0–0)
OVALOCYTES BLD QL SMEAR: NORMAL
PLAT MORPH BLD: NORMAL
PLATELET # BLD AUTO: 74 10*3/MM3 (ref 140–500)
PMV BLD AUTO: 10.7 FL (ref 6–12)
POLYCHROMASIA BLD QL SMEAR: NORMAL
POTASSIUM BLD-SCNC: 4 MMOL/L (ref 3.5–5.2)
PROT SERPL-MCNC: 7.3 G/DL (ref 6–8.5)
RBC # BLD AUTO: 4.22 10*6/MM3 (ref 4.6–6)
SODIUM BLD-SCNC: 136 MMOL/L (ref 136–145)
WBC MORPH BLD: NORMAL
WBC NRBC COR # BLD: 4.19 10*3/MM3 (ref 4.5–10.7)

## 2017-09-12 PROCEDURE — 83690 ASSAY OF LIPASE: CPT | Performed by: INTERNAL MEDICINE

## 2017-09-12 PROCEDURE — 25010000002 MAGNESIUM SULFATE PER 500 MG OF MAGNESIUM: Performed by: INTERNAL MEDICINE

## 2017-09-12 PROCEDURE — 85025 COMPLETE CBC W/AUTO DIFF WBC: CPT | Performed by: INTERNAL MEDICINE

## 2017-09-12 PROCEDURE — 25010000002 LORAZEPAM PER 2 MG: Performed by: INTERNAL MEDICINE

## 2017-09-12 PROCEDURE — 85007 BL SMEAR W/DIFF WBC COUNT: CPT | Performed by: INTERNAL MEDICINE

## 2017-09-12 PROCEDURE — 80053 COMPREHEN METABOLIC PANEL: CPT | Performed by: INTERNAL MEDICINE

## 2017-09-12 PROCEDURE — 25010000002 THIAMINE PER 100 MG: Performed by: INTERNAL MEDICINE

## 2017-09-12 RX ORDER — BUTALBITAL, ACETAMINOPHEN AND CAFFEINE 50; 325; 40 MG/1; MG/1; MG/1
1 TABLET ORAL EVERY 6 HOURS PRN
Status: DISCONTINUED | OUTPATIENT
Start: 2017-09-12 | End: 2017-09-13 | Stop reason: HOSPADM

## 2017-09-12 RX ADMIN — MAGNESIUM SULFATE HEPTAHYDRATE 100 ML/HR: 500 INJECTION, SOLUTION INTRAMUSCULAR; INTRAVENOUS at 09:29

## 2017-09-12 RX ADMIN — Medication 100 MG: at 09:24

## 2017-09-12 RX ADMIN — VENLAFAXINE HYDROCHLORIDE 150 MG: 150 CAPSULE, EXTENDED RELEASE ORAL at 09:24

## 2017-09-12 RX ADMIN — LORAZEPAM 1 MG: 1 TABLET ORAL at 18:46

## 2017-09-12 RX ADMIN — LORAZEPAM 1 MG: 2 INJECTION INTRAMUSCULAR; INTRAVENOUS at 09:25

## 2017-09-12 RX ADMIN — Medication 1 TABLET: at 09:24

## 2017-09-12 RX ADMIN — PANTOPRAZOLE SODIUM 40 MG: 40 TABLET, DELAYED RELEASE ORAL at 06:22

## 2017-09-12 RX ADMIN — METOPROLOL SUCCINATE 50 MG: 50 TABLET, FILM COATED, EXTENDED RELEASE ORAL at 09:24

## 2017-09-12 RX ADMIN — BUTALBITAL, ACETAMINOPHEN, AND CAFFEINE 1 TABLET: 50; 325; 40 TABLET ORAL at 16:44

## 2017-09-12 NOTE — PROGRESS NOTES
Name: Ton Edwards ADMIT: 9/10/2017   : 1960  PCP: Omer Santos MD    MRN: [id number] LOS: 2 days   AGE/SEX: 57 y.o. male  ROOM: [facility]   Subjective   Subjective  CC/Reason for follow-up: alcohol withdrawal  No acute events.  Patient states he feels much better today. Abdominal pain has resolved, and he is tolerating a regular diet.  Required a dose of ativan this AM but has not since.  Denies n/v/d.  Had 2BM today.  Objective   Vital Signs  Temp:  [97.5 °F (36.4 °C)-98.9 °F (37.2 °C)] 97.6 °F (36.4 °C)  Heart Rate:  [58-72] 61  Resp:  [16-20] 18  BP: (147-176)/(84-97) 176/97  SpO2:  [93 %-94 %] 93 %  on  Flow (L/min):  [2] 2;   O2 Device: room air  Body mass index is 33.08 kg/(m^2).    Physical Exam  General: Alert, no distress  Head: NCAT  Eyes: EOMI, conjunctivae nml  Mouth/Throat:Oropharynx clear and moist  Neck: Supple, no jvd  Cardiac:normal rate, regular rhythm  Respiratory: Clear to ascultation, normal effort  Abdomen: soft, non-tender, bowel sounds are normoactive  MSK: no deformity, no tenderness, no dependent edema  Extremities no cyanosis, peripheral pulses intact  Neuro: oriented x3, no gross deficits, no asterixis/tremor  Psych: appropriate mood and affect  Results Review:       I reviewed the patient's new clinical results.    Results from last 7 days  Lab Units 17  0505 17  2309 17  0625 09/10/17  0432   WBC 10*3/mm3 4.19* 4.28* 3.70* 8.25   HEMOGLOBIN g/dL 12.8* 12.9* 12.1* 14.2   PLATELETS 10*3/mm3 74* 71* 81* 156       Results from last 7 days  Lab Units 17  0505 17  0625 09/10/17  1636 09/10/17  0432   SODIUM mmol/L 136 140 145 146*   POTASSIUM mmol/L 4.0 3.7 4.0 3.5   CHLORIDE mmol/L 103 102 104 102   CO2 mmol/L 20.2* 26.3 26.1 23.3   BUN mg/dL 6 6 4* 4*   CREATININE mg/dL 0.70* 0.80 0.71* 0.79   GLUCOSE mg/dL 96 116* 104* 117*   Estimated Creatinine Clearance: 137 mL/min (by C-G formula based on Cr of 0.7).    Results from last 7 days  Lab Units  09/12/17  0505 09/11/17  0625 09/10/17  1636 09/10/17  0432   CALCIUM mg/dL 8.1* 8.4* 7.7* 8.9   ALBUMIN g/dL 3.40* 3.70  --  4.50   MAGNESIUM mg/dL  --  1.9  --   --          metoprolol succinate XL 50 mg Oral Daily   IV Fluids 1000 mL + additives 100 mL/hr Intravenous Daily   multivitamin 1 tablet Oral Daily   pantoprazole 40 mg Oral QAM   thiamine 100 mg Oral Daily   venlafaxine  mg Oral Daily      Diet Clear Liquid      Assessment/Plan   Assessment:     Active Hospital Problems (** Indicates Principal Problem)    Diagnosis Date Noted   • Thrombocytopenia [D69.6] 09/11/2017   • Pancytopenia [D61.818] 09/11/2017   • Lymphocytosis [D72.820] 09/10/2017   • DTs (delirium tremens) [F10.231] 09/10/2017   • Alcoholic cirrhosis [K70.30] 08/11/2016   • Alcohol abuse [F10.10] 08/06/2016   • History of seizure [Z87.898] 08/06/2016   • Benign essential HTN [I10] 08/06/2016      Resolved Hospital Problems    Diagnosis Date Noted Date Resolved   • Abdominal pain [R10.9] 09/11/2017 09/12/2017   • Atrial fibrillation with RVR [I48.91] 09/10/2017 09/12/2017       Plan:   EtOH abuse with withdrawal  -on CIWA protocol, requiring less ativan  -continue with vitamin replacement  -taking PO better, will SLIV  -access following    Alcoholic Cirrhosis  -does not appear to have ascites or PSE  -continue with current meds    HTN  -continue on metoprolol    DVT prophylaxis  -lovenox    Disposition  Home tomorrow if he continues to improve.      Adebayo Boyce MD  [facility]  09/12/17  5:00 PM

## 2017-09-12 NOTE — PROGRESS NOTES
Discharge Planning Assessment  [facility]     Patient Name: Ton Edwards  MRN: [id number]  Today's Date: 9/12/2017    Admit Date: 9/10/2017          Discharge Needs Assessment       09/12/17 1001    Living Environment    Lives With alone    Living Arrangements apartment    Home Accessibility no concerns    Stair Railings at Home none    Transportation Available car    Living Environment    Able to Return to Prior Living Arrangements yes    Discharge Needs Assessment    Equipment Currently Used at Home none    Discharge Planning Comments Spoke with pt at bedside.  He is drowsy, but  A&Ox4.  He awakens easily and is able to answer my questions without difficulty.  IMM letter given.   Pt lives alone in a first level apartment, is IADLs and can drive.  His girlfriend, Mari was out of town for a few days, but has now returned to town.  He says she is his primary contact.  No hx of DME, HH, or SNF.   Pt says he plans to return to his apartment upon DC.              Discharge Plan       09/12/17 1031    Case Management/Social Work Plan    Plan Pt plans to return to his apartment upon DC.  Denies any DC needs at present.                Demographic Summary       09/12/17 0959    Referral Information    Admission Type inpatient    Arrived From home or self-care    Referral Source admission list    Reason For Consult discharge planning    Record Reviewed medical record    Primary Care Physician Information    Name Omer Santos MD            Functional Status       09/12/17 1000    Functional Status Current    Ambulation 2-->assistive person    Transferring 0-->independent    Toileting 2-->assistive person    Bathing 2-->assistive person    Dressing 2-->assistive person    Eating 2-->assistive person    Communication 0-->understands/communicates without difficulty    Swallowing (if score 2 or more for any item, consult Rehab Services) 0-->swallows foods/liquids without difficulty    Functional Status Prior     Ambulation 0-->independent    Transferring 0-->independent    Toileting 0-->independent    Bathing 0-->independent    Dressing 0-->independent    Eating 0-->independent    Communication 0-->understands/communicates without difficulty    Swallowing 0-->swallows foods/liquids without difficulty    IADL    Medications independent    Meal Preparation independent    Housekeeping independent    Laundry independent    Shopping independent    Oral Care independent         Patient Forms       09/12/17 1006    Patient Forms    Important Message from Medicare (IMM) Delivered    Delivered to Patient    Method of delivery In person          Navya Douglas RN

## 2017-09-12 NOTE — PLAN OF CARE
Problem: Patient Care Overview (Adult)  Goal: Plan of Care Review  Outcome: Ongoing (interventions implemented as appropriate)    17 0455   Coping/Psychosocial Response Interventions   Plan Of Care Reviewed With patient   Patient Care Overview   Progress improving   Outcome Evaluation   Outcome Summary/Follow up Plan CIWA score continuing to fluctuate. VSS, A&Ox4. Pt still c/o abd pain, CT scan ordered by [facility] with aditional bloodwork. Pt continues to be restless and agitated at times but improving overall. Will continue to monitor closely         Problem: Acute Alcohol Withdrawal Syndrome, Risk For/Actual (Adult)  Goal: Signs and Symptoms of Listed Potential Problems Will be Absent or Manageable (Acute Alcohol Withdrawal Syndrome, Risk For/Actual)  Outcome: Ongoing (interventions implemented as appropriate)  Goal: Signs and Symptoms of Listed Potential Problems Will be Absent or Manageable (Acute Alcohol Withdrawal Syndrome, Risk For/Actual)  Outcome: Ongoing (interventions implemented as appropriate)    Problem: Pulmonary Hypertension, Persistent ([place],NICU)  Goal: Signs and Symptoms of Listed Potential Problems Will be Absent or Manageable (Pulmonary Hypertension, Persistent)  Outcome: Ongoing (interventions implemented as appropriate)    Problem: Seizure Disorder/Epilepsy (Adult)  Goal: Signs and Symptoms of Listed Potential Problems Will be Absent or Manageable (Seizure Disorder/Epilepsy)  Outcome: Ongoing (interventions implemented as appropriate)    Problem: Fall Risk (Adult)  Goal: Identify Related Risk Factors and Signs and Symptoms  Outcome: Ongoing (interventions implemented as appropriate)  Goal: Absence of Falls  Outcome: Ongoing (interventions implemented as appropriate)

## 2017-09-12 NOTE — PLAN OF CARE
Problem: Patient Care Overview (Adult)  Goal: Plan of Care Review  Outcome: Ongoing (interventions implemented as appropriate)    17 1725   Coping/Psychosocial Response Interventions   Plan Of Care Reviewed With patient   Patient Care Overview   Progress improving   Outcome Evaluation   Outcome Summary/Follow up Plan CIWA improving. VSS. A&O x4. Pt c/o HA. Fioricet ordered and given. Ativan given x1 this am. Pt rested most of shift. Advanced diet to regular per protocol. Will continue to monitor.        Goal: Discharge Needs Assessment  Outcome: Ongoing (interventions implemented as appropriate)    Problem: Acute Alcohol Withdrawal Syndrome, Risk For/Actual (Adult)  Goal: Signs and Symptoms of Listed Potential Problems Will be Absent or Manageable (Acute Alcohol Withdrawal Syndrome, Risk For/Actual)  Outcome: Ongoing (interventions implemented as appropriate)  Goal: Signs and Symptoms of Listed Potential Problems Will be Absent or Manageable (Acute Alcohol Withdrawal Syndrome, Risk For/Actual)  Outcome: Ongoing (interventions implemented as appropriate)    Problem: Pulmonary Hypertension, Persistent (,NICU)  Goal: Signs and Symptoms of Listed Potential Problems Will be Absent or Manageable (Pulmonary Hypertension, Persistent)  Outcome: Ongoing (interventions implemented as appropriate)    Problem: Seizure Disorder/Epilepsy (Adult)  Goal: Signs and Symptoms of Listed Potential Problems Will be Absent or Manageable (Seizure Disorder/Epilepsy)  Outcome: Ongoing (interventions implemented as appropriate)    Problem: Fall Risk (Adult)  Goal: Identify Related Risk Factors and Signs and Symptoms  Outcome: Ongoing (interventions implemented as appropriate)  Goal: Absence of Falls  Outcome: Ongoing (interventions implemented as appropriate)

## 2017-09-13 VITALS
TEMPERATURE: 98.6 F | HEART RATE: 59 BPM | OXYGEN SATURATION: 92 % | HEIGHT: 69 IN | WEIGHT: 219 LBS | BODY MASS INDEX: 32.44 KG/M2 | SYSTOLIC BLOOD PRESSURE: 160 MMHG | DIASTOLIC BLOOD PRESSURE: 85 MMHG | RESPIRATION RATE: 16 BRPM

## 2017-09-13 PROBLEM — F10.931 DTS (DELIRIUM TREMENS) (HCC): Status: RESOLVED | Noted: 2017-09-10 | Resolved: 2017-09-13

## 2017-09-13 LAB
ALBUMIN SERPL-MCNC: 3.8 G/DL (ref 3.5–5.2)
ALBUMIN/GLOB SERPL: 1 G/DL
ALP SERPL-CCNC: 66 U/L (ref 39–117)
ALT SERPL W P-5'-P-CCNC: 33 U/L (ref 1–41)
ANION GAP SERPL CALCULATED.3IONS-SCNC: 13 MMOL/L
AST SERPL-CCNC: 45 U/L (ref 1–40)
BASOPHILS # BLD AUTO: 0.01 10*3/MM3 (ref 0–0.2)
BASOPHILS NFR BLD AUTO: 0.2 % (ref 0–1.5)
BILIRUB SERPL-MCNC: 0.7 MG/DL (ref 0.1–1.2)
BUN BLD-MCNC: 9 MG/DL (ref 6–20)
BUN/CREAT SERPL: 12.5 (ref 7–25)
CALCIUM SPEC-SCNC: 8.4 MG/DL (ref 8.6–10.5)
CHLORIDE SERPL-SCNC: 104 MMOL/L (ref 98–107)
CO2 SERPL-SCNC: 24 MMOL/L (ref 22–29)
CREAT BLD-MCNC: 0.72 MG/DL (ref 0.76–1.27)
DEPRECATED RDW RBC AUTO: 47.6 FL (ref 37–54)
EOSINOPHIL # BLD AUTO: 0.13 10*3/MM3 (ref 0–0.7)
EOSINOPHIL NFR BLD AUTO: 2.3 % (ref 0.3–6.2)
ERYTHROCYTE [DISTWIDTH] IN BLOOD BY AUTOMATED COUNT: 14.2 % (ref 11.5–14.5)
GFR SERPL CREATININE-BSD FRML MDRD: 113 ML/MIN/1.73
GLOBULIN UR ELPH-MCNC: 3.7 GM/DL
GLUCOSE BLD-MCNC: 124 MG/DL (ref 65–99)
HCT VFR BLD AUTO: 37.9 % (ref 40.4–52.2)
HGB BLD-MCNC: 12.7 G/DL (ref 13.7–17.6)
IMM GRANULOCYTES # BLD: 0.02 10*3/MM3 (ref 0–0.03)
IMM GRANULOCYTES NFR BLD: 0.4 % (ref 0–0.5)
LYMPHOCYTES # BLD AUTO: 2.41 10*3/MM3 (ref 0.9–4.8)
LYMPHOCYTES NFR BLD AUTO: 42.5 % (ref 19.6–45.3)
MCH RBC QN AUTO: 30.8 PG (ref 27–32.7)
MCHC RBC AUTO-ENTMCNC: 33.5 G/DL (ref 32.6–36.4)
MCV RBC AUTO: 91.8 FL (ref 79.8–96.2)
MONOCYTES # BLD AUTO: 0.29 10*3/MM3 (ref 0.2–1.2)
MONOCYTES NFR BLD AUTO: 5.1 % (ref 5–12)
NEUTROPHILS # BLD AUTO: 2.81 10*3/MM3 (ref 1.9–8.1)
NEUTROPHILS NFR BLD AUTO: 49.5 % (ref 42.7–76)
PLATELET # BLD AUTO: 78 10*3/MM3 (ref 140–500)
PMV BLD AUTO: 10.6 FL (ref 6–12)
POTASSIUM BLD-SCNC: 3.5 MMOL/L (ref 3.5–5.2)
PROT SERPL-MCNC: 7.5 G/DL (ref 6–8.5)
RBC # BLD AUTO: 4.13 10*6/MM3 (ref 4.6–6)
SODIUM BLD-SCNC: 141 MMOL/L (ref 136–145)
WBC NRBC COR # BLD: 5.67 10*3/MM3 (ref 4.5–10.7)

## 2017-09-13 PROCEDURE — 85025 COMPLETE CBC W/AUTO DIFF WBC: CPT | Performed by: INTERNAL MEDICINE

## 2017-09-13 PROCEDURE — 25010000002 THIAMINE PER 100 MG: Performed by: INTERNAL MEDICINE

## 2017-09-13 PROCEDURE — 80053 COMPREHEN METABOLIC PANEL: CPT | Performed by: INTERNAL MEDICINE

## 2017-09-13 PROCEDURE — 25010000002 MAGNESIUM SULFATE PER 500 MG OF MAGNESIUM: Performed by: INTERNAL MEDICINE

## 2017-09-13 RX ORDER — LANOLIN ALCOHOL/MO/W.PET/CERES
100 CREAM (GRAM) TOPICAL DAILY
Qty: 30 TABLET | Refills: 0 | Status: SHIPPED | OUTPATIENT
Start: 2017-09-13 | End: 2017-09-23 | Stop reason: HOSPADM

## 2017-09-13 RX ORDER — DIPHENOXYLATE HYDROCHLORIDE AND ATROPINE SULFATE 2.5; .025 MG/1; MG/1
1 TABLET ORAL DAILY
Qty: 30 TABLET | Refills: 0 | Status: SHIPPED | OUTPATIENT
Start: 2017-09-13

## 2017-09-13 RX ORDER — CHLORDIAZEPOXIDE HYDROCHLORIDE 10 MG/1
10 CAPSULE, GELATIN COATED ORAL ONCE
Status: COMPLETED | OUTPATIENT
Start: 2017-09-13 | End: 2017-09-13

## 2017-09-13 RX ADMIN — Medication 100 MG: at 08:19

## 2017-09-13 RX ADMIN — MAGNESIUM SULFATE HEPTAHYDRATE 100 ML/HR: 500 INJECTION, SOLUTION INTRAMUSCULAR; INTRAVENOUS at 08:19

## 2017-09-13 RX ADMIN — Medication 1 TABLET: at 08:19

## 2017-09-13 RX ADMIN — CHLORDIAZEPOXIDE HYDROCHLORIDE 10 MG: 10 CAPSULE ORAL at 13:54

## 2017-09-13 RX ADMIN — METOPROLOL SUCCINATE 50 MG: 50 TABLET, FILM COATED, EXTENDED RELEASE ORAL at 08:19

## 2017-09-13 RX ADMIN — PANTOPRAZOLE SODIUM 40 MG: 40 TABLET, DELAYED RELEASE ORAL at 06:01

## 2017-09-13 RX ADMIN — VENLAFAXINE HYDROCHLORIDE 150 MG: 150 CAPSULE, EXTENDED RELEASE ORAL at 08:19

## 2017-09-13 NOTE — PLAN OF CARE
Problem: Patient Care Overview (Adult)  Goal: Plan of Care Review  Outcome: Ongoing (interventions implemented as appropriate)    17 0357   Coping/Psychosocial Response Interventions   Plan Of Care Reviewed With patient   Patient Care Overview   Progress improving   Outcome Evaluation   Outcome Summary/Follow up Plan CIWA improving, not requiring Ativan at this time. VSS. A&ox4. Pt hoping for discharge today.. HA and N/V resolved. Will continue to monitor closely         Problem: Acute Alcohol Withdrawal Syndrome, Risk For/Actual (Adult)  Goal: Signs and Symptoms of Listed Potential Problems Will be Absent or Manageable (Acute Alcohol Withdrawal Syndrome, Risk For/Actual)  Outcome: Ongoing (interventions implemented as appropriate)  Goal: Signs and Symptoms of Listed Potential Problems Will be Absent or Manageable (Acute Alcohol Withdrawal Syndrome, Risk For/Actual)  Outcome: Ongoing (interventions implemented as appropriate)    Problem: Pulmonary Hypertension, Persistent (,NICU)  Goal: Signs and Symptoms of Listed Potential Problems Will be Absent or Manageable (Pulmonary Hypertension, Persistent)  Outcome: Ongoing (interventions implemented as appropriate)    Problem: Seizure Disorder/Epilepsy (Adult)  Goal: Signs and Symptoms of Listed Potential Problems Will be Absent or Manageable (Seizure Disorder/Epilepsy)  Outcome: Ongoing (interventions implemented as appropriate)    Problem: Fall Risk (Adult)  Goal: Identify Related Risk Factors and Signs and Symptoms  Outcome: Ongoing (interventions implemented as appropriate)  Goal: Absence of Falls  Outcome: Ongoing (interventions implemented as appropriate)

## 2017-09-13 NOTE — DISCHARGE SUMMARY
Date of Admission: 9/10/2017  Date of Discharge:  9/13/2017  Primary Care Physician: Omer Santos MD     Discharge Diagnosis:  Active Hospital Problems (** Indicates Principal Problem)    Diagnosis Date Noted   • Thrombocytopenia [D69.6] 09/11/2017   • Pancytopenia [D61.818] 09/11/2017   • Lymphocytosis [D72.820] 09/10/2017   • Alcoholic cirrhosis [K70.30] 08/11/2016   • Alcohol abuse [F10.10] 08/06/2016   • History of seizure [Z87.898] 08/06/2016   • Benign essential HTN [I10] 08/06/2016      Resolved Hospital Problems    Diagnosis Date Noted Date Resolved   • Abdominal pain [R10.9] 09/11/2017 09/12/2017   • Atrial fibrillation with RVR [I48.91] 09/10/2017 09/12/2017   • DTs (delirium tremens) [F10.231] 09/10/2017 09/13/2017       Presenting Problem/History of Present Illness:  Alcohol abuse [F10.10]  Atrial fibrillation with RVR [I48.91]  Alcohol intoxication, with unspecified complication [F10.129]     Hospital Course:  The patient is a 57 y.o. male with a history of alcohol abuse who presented with mild alcohol intoxication and signs of withdrawal.  Please see H&P for further details.  Shortly following admission, he began to have alcohol withdrawal.  He was treated with ativan per the CIWA protocol, and had vitamin replacement.  He improved over the following few days, and had no signs of withdrawal at the time of discharge.  He had not required ativan for over 18 hours prior to discharge.  He was sent home with multivitamins and thiamine, and should follow up with his PCP for his pancytopenia, which was stable and almost certainly related to his alcohol use.    Of note, he had a short episode of atrial fibrillation on admission which was self-limited.  This was likely related to his alcohol use.  He was in normal sinus rhythm for the remainder of his hospitalization.   Psychiatric therapy did see the patient while he was here, and he plans to be involved in AA following discharge.     Exam Today:  Blood  "pressure 160/85, pulse 59, temperature 98.6 °F (37 °C), temperature source Oral, resp. rate 16, height 69\" (175.3 cm), weight 219 lb (99.3 kg), SpO2 92 %.  General: Alert, no distress  Head: NCAT  Eyes: EOMI, conjunctivae nml  Mouth/Throat:Oropharynx clear and moist  Neck: Supple, no jvd  Cardiac:normal rate, regular rhythm  Respiratory: Clear to ascultation, normal effort  Abdomen: soft, non-tender, bowel sounds are normoactive  MSK: no deformity, no tenderness, no dependent edema  Extremities no cyanosis, peripheral pulses intact  Neuro: oriented x3, no gross deficits, no asterixis/tremor  Psych: appropriate mood and affect  Procedures Performed:  CT ABDOMEN AND PELVIS WITH CONTRAST.      TECHNIQUE: A routine CT scan of the abdomen and pelvis was performed  with coronal and sagittal reconstructed images.      HISTORY: Periumbilical pain, history of pancreatitis, alcohol abuse.      COMPARISON: 08/10/2016.      FINDINGS:  Lung bases demonstrate no consolidation or effusion.           Liver demonstrates homogeneous parenchyma, no definite mass. Liver is  borderline enlarged and measures 18 cm. Fatty infiltration of the liver  and nodular surface concerning for cirrhosis. Sludge in the gallbladder,  no evidence for acute cholecystitis. No intra or extrahepatic biliary  ductal dilatation.       Spleen is enlarged and measures 15 cm. There are findings of portal  venous hypertension and enlarged portal venous shunt circulation.  Pancreas is unremarkable, no pancreatic ductal dilatation. Adrenal  glands are within normal limits.      Kidneys do not demonstrate hydronephrosis. 0.8 cm nonobstructing stone  of the right kidney. Urinary bladder is unremarkable.          Small and large bowel loops are within normal limits.          No significant retroperitoneal lymphadenopathy.              IMPRESSION:  1.  No definite acute abdominal pathology, no obstructive uropathy or  inflammatory bowel disease.   2.  Hepatic " cirrhosis, hepatosplenomegaly and portal venous  hypertension.  3.  Gallstones, no evidence for acute cholecystitis.  4.  0.8 cm nonobstructing stone of the right kidney.      This report was finalized on 9/12/2017 9:21 PM by Dr. Bonilla Alcantar MD.       Consults:   Consults     Date and Time Order Name Status Description    9/10/2017 0638 [facility] (on-call MD unless specified) Completed            Discharge Disposition:  Home or Self Care    Discharge Medications:   Grace Ton   Home Medication Instructions DEVANG:[id number]    Printed on:09/13/17 1312   Medication Information                      metoprolol succinate XL (TOPROL XL) 50 MG 24 hr tablet  Take 1 tablet by mouth daily.             multivitamin (THERAGRAN) tablet tablet  Take 1 tablet by mouth Daily.             omeprazole (PriLOSEC) 40 MG capsule  Take 1 capsule by mouth daily.             thiamine (VITAMIN B1) 100 MG tablet  Take 1 tablet by mouth Daily.             venlafaxine XR (EFFEXOR-XR) 150 MG 24 hr capsule  Take 150 mg by mouth daily.                 Discharge Diet:   Diet Instructions     Diet: Cardiac, Specialty Diet; Thin Liquids, No Restrictions; Low Sodium; Thin       Discharge Diet:   Cardiac  Specialty Diet      Fluid Consistency:  Thin Liquids, No Restrictions   Specialty Diets:  Low Sodium   Fluid Consistency:  Thin                 Activity at Discharge:   Activity Instructions     Activity as Tolerated                     Follow-up Appointments:  No future appointments.  Additional Instructions for the Follow-ups that You Need to Schedule     Discharge Follow-up with PCP    As directed    Follow Up Details:  1-2 weeks                 Test Results Pending at Discharge:       Adebayo Boyce MD  09/13/17  1:12 PM    Time Spent on Discharge Activities: Greater than 30 minutes.

## 2017-09-13 NOTE — PLAN OF CARE
Problem: Patient Care Overview (Adult)  Goal: Plan of Care Review  Outcome: Ongoing (interventions implemented as appropriate)    17 1409   Coping/Psychosocial Response Interventions   Plan Of Care Reviewed With patient   Patient Care Overview   Progress improving   Outcome Evaluation   Outcome Summary/Follow up Plan VSS, denies pain, no falls, no s/s withdrawal. One time dose of librium given. Pt waiting on a ride and will leave around 1700. WCM patient.        Goal: Discharge Needs Assessment  Outcome: Ongoing (interventions implemented as appropriate)    Problem: Acute Alcohol Withdrawal Syndrome, Risk For/Actual (Adult)  Goal: Signs and Symptoms of Listed Potential Problems Will be Absent or Manageable (Acute Alcohol Withdrawal Syndrome, Risk For/Actual)  Outcome: Ongoing (interventions implemented as appropriate)  Goal: Signs and Symptoms of Listed Potential Problems Will be Absent or Manageable (Acute Alcohol Withdrawal Syndrome, Risk For/Actual)  Outcome: Ongoing (interventions implemented as appropriate)    Problem: Pulmonary Hypertension, Persistent ([place],NICU)  Goal: Signs and Symptoms of Listed Potential Problems Will be Absent or Manageable (Pulmonary Hypertension, Persistent)  Outcome: Ongoing (interventions implemented as appropriate)    Problem: Seizure Disorder/Epilepsy (Adult)  Goal: Signs and Symptoms of Listed Potential Problems Will be Absent or Manageable (Seizure Disorder/Epilepsy)  Outcome: Ongoing (interventions implemented as appropriate)    Problem: Fall Risk (Adult)  Goal: Identify Related Risk Factors and Signs and Symptoms  Outcome: Ongoing (interventions implemented as appropriate)  Goal: Absence of Falls  Outcome: Ongoing (interventions implemented as appropriate)

## 2017-09-13 NOTE — PROGRESS NOTES
Met /w patient.  Chart was reviewed.  Patient is being discharged.  Patient states he is going to return to AA and that he is considering going to [facility].  He has the number.  He states that his girlfriend does not drink and is supportive.  He states she is picking patient up.

## 2017-09-19 ENCOUNTER — HOSPITAL ENCOUNTER (INPATIENT)
Facility: HOSPITAL | Age: 57
LOS: 4 days | Discharge: HOME | End: 2017-09-23
Attending: EMERGENCY MEDICINE | Admitting: INTERNAL MEDICINE

## 2017-09-19 ENCOUNTER — APPOINTMENT (OUTPATIENT)
Dept: CT IMAGING | Facility: HOSPITAL | Age: 57
End: 2017-09-19
Attending: INTERNAL MEDICINE

## 2017-09-19 DIAGNOSIS — F10.939 ALCOHOL WITHDRAWAL, WITH UNSPECIFIED COMPLICATION (HCC): Primary | ICD-10-CM

## 2017-09-19 DIAGNOSIS — F10.929 ALCOHOL INTOXICATION, WITH UNSPECIFIED COMPLICATION (HCC): ICD-10-CM

## 2017-09-19 PROBLEM — Z91.199 MEDICAL NON-COMPLIANCE: Status: ACTIVE | Noted: 2017-09-19

## 2017-09-19 PROBLEM — I48.0 PAROXYSMAL A-FIB (HCC): Status: ACTIVE | Noted: 2017-09-19

## 2017-09-19 LAB
ALBUMIN SERPL-MCNC: 3.7 G/DL (ref 3.5–5.2)
ALBUMIN SERPL-MCNC: 4.3 G/DL (ref 3.5–5.2)
ALBUMIN/GLOB SERPL: 1.1 G/DL
ALBUMIN/GLOB SERPL: 1.1 G/DL
ALP SERPL-CCNC: 66 U/L (ref 39–117)
ALP SERPL-CCNC: 76 U/L (ref 39–117)
ALT SERPL W P-5'-P-CCNC: 52 U/L (ref 1–41)
ALT SERPL W P-5'-P-CCNC: 59 U/L (ref 1–41)
ANION GAP SERPL CALCULATED.3IONS-SCNC: 14.7 MMOL/L
ANION GAP SERPL CALCULATED.3IONS-SCNC: 15.7 MMOL/L
AST SERPL-CCNC: 82 U/L (ref 1–40)
AST SERPL-CCNC: 95 U/L (ref 1–40)
BASOPHILS # BLD AUTO: 0.01 10*3/MM3 (ref 0–0.2)
BASOPHILS # BLD AUTO: 0.01 10*3/MM3 (ref 0–0.2)
BASOPHILS NFR BLD AUTO: 0.1 % (ref 0–1.5)
BASOPHILS NFR BLD AUTO: 0.2 % (ref 0–1.5)
BILIRUB SERPL-MCNC: 0.4 MG/DL (ref 0.1–1.2)
BILIRUB SERPL-MCNC: 0.5 MG/DL (ref 0.1–1.2)
BUN BLD-MCNC: 4 MG/DL (ref 6–20)
BUN BLD-MCNC: 4 MG/DL (ref 6–20)
BUN/CREAT SERPL: 5.1 (ref 7–25)
BUN/CREAT SERPL: 5.6 (ref 7–25)
CALCIUM SPEC-SCNC: 8 MG/DL (ref 8.6–10.5)
CALCIUM SPEC-SCNC: 8.8 MG/DL (ref 8.6–10.5)
CHLORIDE SERPL-SCNC: 103 MMOL/L (ref 98–107)
CHLORIDE SERPL-SCNC: 108 MMOL/L (ref 98–107)
CO2 SERPL-SCNC: 26.3 MMOL/L (ref 22–29)
CO2 SERPL-SCNC: 27.3 MMOL/L (ref 22–29)
CREAT BLD-MCNC: 0.71 MG/DL (ref 0.76–1.27)
CREAT BLD-MCNC: 0.79 MG/DL (ref 0.76–1.27)
DEPRECATED RDW RBC AUTO: 52.6 FL (ref 37–54)
DEPRECATED RDW RBC AUTO: 52.7 FL (ref 37–54)
EOSINOPHIL # BLD AUTO: 0.05 10*3/MM3 (ref 0–0.7)
EOSINOPHIL # BLD AUTO: 0.07 10*3/MM3 (ref 0–0.7)
EOSINOPHIL NFR BLD AUTO: 1 % (ref 0.3–6.2)
EOSINOPHIL NFR BLD AUTO: 1.1 % (ref 0.3–6.2)
ERYTHROCYTE [DISTWIDTH] IN BLOOD BY AUTOMATED COUNT: 15.5 % (ref 11.5–14.5)
ERYTHROCYTE [DISTWIDTH] IN BLOOD BY AUTOMATED COUNT: 15.5 % (ref 11.5–14.5)
ETHANOL BLD-MCNC: 401 MG/DL (ref 0–10)
ETHANOL UR QL: 0.4 %
GFR SERPL CREATININE-BSD FRML MDRD: 101 ML/MIN/1.73
GFR SERPL CREATININE-BSD FRML MDRD: 114 ML/MIN/1.73
GLOBULIN UR ELPH-MCNC: 3.4 GM/DL
GLOBULIN UR ELPH-MCNC: 3.8 GM/DL
GLUCOSE BLD-MCNC: 129 MG/DL (ref 65–99)
GLUCOSE BLD-MCNC: 143 MG/DL (ref 65–99)
HCT VFR BLD AUTO: 34 % (ref 40.4–52.2)
HCT VFR BLD AUTO: 36.3 % (ref 40.4–52.2)
HGB BLD-MCNC: 11.1 G/DL (ref 13.7–17.6)
HGB BLD-MCNC: 12.2 G/DL (ref 13.7–17.6)
IMM GRANULOCYTES # BLD: 0 10*3/MM3 (ref 0–0.03)
IMM GRANULOCYTES # BLD: 0 10*3/MM3 (ref 0–0.03)
IMM GRANULOCYTES NFR BLD: 0 % (ref 0–0.5)
IMM GRANULOCYTES NFR BLD: 0 % (ref 0–0.5)
LYMPHOCYTES # BLD AUTO: 2.63 10*3/MM3 (ref 0.9–4.8)
LYMPHOCYTES # BLD AUTO: 4 10*3/MM3 (ref 0.9–4.8)
LYMPHOCYTES NFR BLD AUTO: 58.3 % (ref 19.6–45.3)
LYMPHOCYTES NFR BLD AUTO: 59.5 % (ref 19.6–45.3)
MCH RBC QN AUTO: 30.4 PG (ref 27–32.7)
MCH RBC QN AUTO: 30.9 PG (ref 27–32.7)
MCHC RBC AUTO-ENTMCNC: 32.6 G/DL (ref 32.6–36.4)
MCHC RBC AUTO-ENTMCNC: 33.6 G/DL (ref 32.6–36.4)
MCV RBC AUTO: 91.9 FL (ref 79.8–96.2)
MCV RBC AUTO: 93.2 FL (ref 79.8–96.2)
MONOCYTES # BLD AUTO: 0.56 10*3/MM3 (ref 0.2–1.2)
MONOCYTES # BLD AUTO: 0.92 10*3/MM3 (ref 0.2–1.2)
MONOCYTES NFR BLD AUTO: 12.7 % (ref 5–12)
MONOCYTES NFR BLD AUTO: 13.4 % (ref 5–12)
NEUTROPHILS # BLD AUTO: 1.17 10*3/MM3 (ref 1.9–8.1)
NEUTROPHILS # BLD AUTO: 1.86 10*3/MM3 (ref 1.9–8.1)
NEUTROPHILS NFR BLD AUTO: 26.5 % (ref 42.7–76)
NEUTROPHILS NFR BLD AUTO: 27.2 % (ref 42.7–76)
PLAT MORPH BLD: NORMAL
PLATELET # BLD AUTO: 142 10*3/MM3 (ref 140–500)
PLATELET # BLD AUTO: 90 10*3/MM3 (ref 140–500)
PMV BLD AUTO: 9.6 FL (ref 6–12)
PMV BLD AUTO: 9.7 FL (ref 6–12)
POTASSIUM BLD-SCNC: 3.7 MMOL/L (ref 3.5–5.2)
POTASSIUM BLD-SCNC: 3.9 MMOL/L (ref 3.5–5.2)
PROT SERPL-MCNC: 7.1 G/DL (ref 6–8.5)
PROT SERPL-MCNC: 8.1 G/DL (ref 6–8.5)
RBC # BLD AUTO: 3.65 10*6/MM3 (ref 4.6–6)
RBC # BLD AUTO: 3.95 10*6/MM3 (ref 4.6–6)
RBC MORPH BLD: NORMAL
SODIUM BLD-SCNC: 146 MMOL/L (ref 136–145)
SODIUM BLD-SCNC: 149 MMOL/L (ref 136–145)
WBC MORPH BLD: NORMAL
WBC NRBC COR # BLD: 4.42 10*3/MM3 (ref 4.5–10.7)
WBC NRBC COR # BLD: 6.86 10*3/MM3 (ref 4.5–10.7)

## 2017-09-19 PROCEDURE — 85025 COMPLETE CBC W/AUTO DIFF WBC: CPT | Performed by: EMERGENCY MEDICINE

## 2017-09-19 PROCEDURE — 80307 DRUG TEST PRSMV CHEM ANLYZR: CPT | Performed by: EMERGENCY MEDICINE

## 2017-09-19 PROCEDURE — 25010000002 ENOXAPARIN PER 10 MG: Performed by: INTERNAL MEDICINE

## 2017-09-19 PROCEDURE — 25010000002 LORAZEPAM PER 2 MG: Performed by: INTERNAL MEDICINE

## 2017-09-19 PROCEDURE — 80053 COMPREHEN METABOLIC PANEL: CPT | Performed by: INTERNAL MEDICINE

## 2017-09-19 PROCEDURE — 25010000002 MAGNESIUM SULFATE PER 500 MG OF MAGNESIUM: Performed by: EMERGENCY MEDICINE

## 2017-09-19 PROCEDURE — 25010000002 MORPHINE PER 10 MG: Performed by: INTERNAL MEDICINE

## 2017-09-19 PROCEDURE — 25010000002 ONDANSETRON PER 1 MG: Performed by: INTERNAL MEDICINE

## 2017-09-19 PROCEDURE — 25010000002 LORAZEPAM PER 2 MG: Performed by: EMERGENCY MEDICINE

## 2017-09-19 PROCEDURE — 36415 COLL VENOUS BLD VENIPUNCTURE: CPT | Performed by: INTERNAL MEDICINE

## 2017-09-19 PROCEDURE — 99285 EMERGENCY DEPT VISIT HI MDM: CPT

## 2017-09-19 PROCEDURE — 80053 COMPREHEN METABOLIC PANEL: CPT | Performed by: EMERGENCY MEDICINE

## 2017-09-19 PROCEDURE — 25010000002 THIAMINE PER 100 MG: Performed by: EMERGENCY MEDICINE

## 2017-09-19 PROCEDURE — 85025 COMPLETE CBC W/AUTO DIFF WBC: CPT | Performed by: INTERNAL MEDICINE

## 2017-09-19 PROCEDURE — 70450 CT HEAD/BRAIN W/O DYE: CPT

## 2017-09-19 PROCEDURE — 85007 BL SMEAR W/DIFF WBC COUNT: CPT | Performed by: EMERGENCY MEDICINE

## 2017-09-19 RX ORDER — LORAZEPAM 2 MG/ML
2 INJECTION INTRAMUSCULAR
Status: DISCONTINUED | OUTPATIENT
Start: 2017-09-19 | End: 2017-09-21

## 2017-09-19 RX ORDER — SODIUM CHLORIDE 0.9 % (FLUSH) 0.9 %
10 SYRINGE (ML) INJECTION AS NEEDED
Status: DISCONTINUED | OUTPATIENT
Start: 2017-09-19 | End: 2017-09-23 | Stop reason: HOSPADM

## 2017-09-19 RX ORDER — FAMOTIDINE 10 MG/ML
40 INJECTION, SOLUTION INTRAVENOUS 2 TIMES DAILY
Status: DISCONTINUED | OUTPATIENT
Start: 2017-09-19 | End: 2017-09-21

## 2017-09-19 RX ORDER — ALUMINA, MAGNESIA, AND SIMETHICONE 2400; 2400; 240 MG/30ML; MG/30ML; MG/30ML
15 SUSPENSION ORAL EVERY 6 HOURS PRN
Status: DISCONTINUED | OUTPATIENT
Start: 2017-09-19 | End: 2017-09-23 | Stop reason: HOSPADM

## 2017-09-19 RX ORDER — VENLAFAXINE HYDROCHLORIDE 150 MG/1
150 CAPSULE, EXTENDED RELEASE ORAL
Status: DISCONTINUED | OUTPATIENT
Start: 2017-09-20 | End: 2017-09-23 | Stop reason: HOSPADM

## 2017-09-19 RX ORDER — LORAZEPAM 1 MG/1
2 TABLET ORAL
Status: DISCONTINUED | OUTPATIENT
Start: 2017-09-19 | End: 2017-09-22

## 2017-09-19 RX ORDER — LORAZEPAM 2 MG/ML
1 INJECTION INTRAMUSCULAR ONCE
Status: COMPLETED | OUTPATIENT
Start: 2017-09-19 | End: 2017-09-19

## 2017-09-19 RX ORDER — ONDANSETRON 2 MG/ML
4 INJECTION INTRAMUSCULAR; INTRAVENOUS EVERY 6 HOURS PRN
Status: DISCONTINUED | OUTPATIENT
Start: 2017-09-19 | End: 2017-09-23 | Stop reason: HOSPADM

## 2017-09-19 RX ORDER — SODIUM CHLORIDE 0.9 % (FLUSH) 0.9 %
1-10 SYRINGE (ML) INJECTION AS NEEDED
Status: DISCONTINUED | OUTPATIENT
Start: 2017-09-19 | End: 2017-09-23 | Stop reason: HOSPADM

## 2017-09-19 RX ORDER — METOPROLOL SUCCINATE 50 MG/1
50 TABLET, EXTENDED RELEASE ORAL ONCE
Status: COMPLETED | OUTPATIENT
Start: 2017-09-19 | End: 2017-09-19

## 2017-09-19 RX ORDER — SODIUM CHLORIDE 9 MG/ML
50 INJECTION, SOLUTION INTRAVENOUS CONTINUOUS
Status: DISCONTINUED | OUTPATIENT
Start: 2017-09-19 | End: 2017-09-21

## 2017-09-19 RX ORDER — LORAZEPAM 1 MG/1
1 TABLET ORAL
Status: DISCONTINUED | OUTPATIENT
Start: 2017-09-19 | End: 2017-09-23 | Stop reason: HOSPADM

## 2017-09-19 RX ORDER — MORPHINE SULFATE 2 MG/ML
2 INJECTION, SOLUTION INTRAMUSCULAR; INTRAVENOUS EVERY 4 HOURS PRN
Status: DISCONTINUED | OUTPATIENT
Start: 2017-09-19 | End: 2017-09-21

## 2017-09-19 RX ORDER — LORAZEPAM 2 MG/ML
1 INJECTION INTRAMUSCULAR
Status: DISCONTINUED | OUTPATIENT
Start: 2017-09-19 | End: 2017-09-21

## 2017-09-19 RX ORDER — METOPROLOL SUCCINATE 50 MG/1
50 TABLET, EXTENDED RELEASE ORAL
Status: DISCONTINUED | OUTPATIENT
Start: 2017-09-20 | End: 2017-09-20

## 2017-09-19 RX ADMIN — LORAZEPAM 2 MG: 2 INJECTION INTRAMUSCULAR; INTRAVENOUS at 13:21

## 2017-09-19 RX ADMIN — ALUMINUM HYDROXIDE, MAGNESIUM HYDROXIDE, AND DIMETHICONE 15 ML: 400; 400; 40 SUSPENSION ORAL at 16:37

## 2017-09-19 RX ADMIN — SODIUM CHLORIDE 100 ML/HR: 9 INJECTION, SOLUTION INTRAVENOUS at 08:55

## 2017-09-19 RX ADMIN — MORPHINE SULFATE 2 MG: 2 INJECTION, SOLUTION INTRAMUSCULAR; INTRAVENOUS at 20:23

## 2017-09-19 RX ADMIN — SODIUM CHLORIDE 100 ML/HR: 9 INJECTION, SOLUTION INTRAVENOUS at 19:17

## 2017-09-19 RX ADMIN — LORAZEPAM 1 MG: 2 INJECTION INTRAMUSCULAR; INTRAVENOUS at 22:48

## 2017-09-19 RX ADMIN — METOPROLOL SUCCINATE 50 MG: 50 TABLET, FILM COATED, EXTENDED RELEASE ORAL at 22:48

## 2017-09-19 RX ADMIN — ONDANSETRON 4 MG: 2 INJECTION INTRAMUSCULAR; INTRAVENOUS at 15:15

## 2017-09-19 RX ADMIN — LORAZEPAM 1 MG: 2 INJECTION INTRAMUSCULAR; INTRAVENOUS at 04:52

## 2017-09-19 RX ADMIN — LORAZEPAM 2 MG: 2 INJECTION INTRAMUSCULAR; INTRAVENOUS at 20:43

## 2017-09-19 RX ADMIN — LORAZEPAM 2 MG: 2 INJECTION INTRAMUSCULAR; INTRAVENOUS at 05:45

## 2017-09-19 RX ADMIN — FOLIC ACID 1000 ML/HR: 5 INJECTION, SOLUTION INTRAMUSCULAR; INTRAVENOUS; SUBCUTANEOUS at 05:39

## 2017-09-19 RX ADMIN — FAMOTIDINE 40 MG: 10 INJECTION, SOLUTION INTRAVENOUS at 19:14

## 2017-09-19 RX ADMIN — ONDANSETRON 4 MG: 2 INJECTION INTRAMUSCULAR; INTRAVENOUS at 20:23

## 2017-09-19 RX ADMIN — ENOXAPARIN SODIUM 40 MG: 40 INJECTION SUBCUTANEOUS at 10:03

## 2017-09-19 RX ADMIN — LORAZEPAM 1 MG: 2 INJECTION INTRAMUSCULAR; INTRAVENOUS at 15:15

## 2017-09-19 RX ADMIN — MORPHINE SULFATE 2 MG: 2 INJECTION, SOLUTION INTRAMUSCULAR; INTRAVENOUS at 16:37

## 2017-09-19 NOTE — H&P
Internal medicine history and physical    INTERNAL MEDICINE   [facility]       Patient Identification:  Name: Ton Edwards  Age: 57 y.o.  Sex: male  :  1960  MRN: [id number]                   Primary Care Physician: Omer Santos MD                                   Chief Complaint:  Presented to the emergency room early in the morning complaining of alcohol related problems.    History of Present Illness:   Patient is currently under the effect of Ativan given in the emergency room and significantly somnolent at this time and cannot give an account of his symptoms.  Most information was gathered from recent interactions at various hospitals in the last couple weeks, as well as discussion with nursing staff.  Patient is a 57-year-old male who has history of alcohol abuse and due to complications from it started drinking again after being released from the hospital recently.  He has been binge drinking up to a gallon of alcohol everyday and 30 beers in the last 24 hours according to the documentation in the ER notes.  He was recently hospitalized for atrial fibrillation due to alcohol and was discharged on medical treatment.  She did not take.  He also did not follow-up as he was recommended.  In the emergency room he was given banana bag Zofran and IV Ativan total of 3 mg.  By the time he made it to the floor he was somnolent and does moan and refuses touch but does not engage.    Past Medical History:  Past Medical History:   Diagnosis Date   • Alcohol abuse    • Alcohol withdrawal seizure    • Alcoholic cirrhosis 2016    Seen on CT- had not had biopsy    • Alcoholism    • Depression    • Hypertension      Past Surgical History:  History reviewed. No pertinent surgical history.   Home Meds:  Prescriptions Prior to Admission   Medication Sig Dispense Refill Last Dose   • metoprolol succinate XL (TOPROL XL) 50 MG 24 hr tablet Take 1 tablet by mouth daily. 30 tablet 0 2017   •  multivitamin (THERAGRAN) tablet tablet Take 1 tablet by mouth Daily. 30 tablet 0    • omeprazole (PriLOSEC) 40 MG capsule Take 1 capsule by mouth daily. 30 capsule 0 9/8/2017   • thiamine (VITAMIN B1) 100 MG tablet Take 1 tablet by mouth Daily. 30 tablet 0    • venlafaxine XR (EFFEXOR-XR) 150 MG 24 hr capsule Take 150 mg by mouth daily.   9/8/2017     Current Meds:     Current Facility-Administered Medications:   •  enoxaparin (LOVENOX) syringe 40 mg, 40 mg, Subcutaneous, Daily, Topher Spann MD, 40 mg at 09/19/17 1003  •  LORazepam (ATIVAN) tablet 1 mg, 1 mg, Oral, Q2H PRN **OR** LORazepam (ATIVAN) injection 1 mg, 1 mg, Intravenous, Q2H PRN **OR** LORazepam (ATIVAN) tablet 2 mg, 2 mg, Oral, Q1H PRN **OR** LORazepam (ATIVAN) injection 2 mg, 2 mg, Intravenous, Q1H PRN **OR** LORazepam (ATIVAN) injection 2 mg, 2 mg, Intravenous, Q15 Min PRN, 2 mg at 09/19/17 0545 **OR** LORazepam (ATIVAN) injection 2 mg, 2 mg, Intramuscular, Q15 Min PRN, Topher Spann MD  •  [START ON 9/20/2017] multiple vitamin (M.V.I. Adult) 10 mL, thiamine (B-1) 100 mg, folic acid 1 mg, magnesium sulfate 2 g in sodium chloride 0.9 % 1,000 mL infusion, 100 mL/hr, Intravenous, Daily, Topher Spann MD  •  ondansetron (ZOFRAN) injection 4 mg, 4 mg, Intravenous, Q6H PRN, Topher Spann MD  •  sodium chloride 0.9 % flush 1-10 mL, 1-10 mL, Intravenous, PRN, Topher Spann MD  •  Insert peripheral IV, , , Once **AND** sodium chloride 0.9 % flush 10 mL, 10 mL, Intravenous, PRN, Pablo Newby MD  •  sodium chloride 0.9 % infusion, 100 mL/hr, Intravenous, Continuous, Topher Spann MD, Last Rate: 100 mL/hr at 09/19/17 0855, 100 mL/hr at 09/19/17 0855  Allergies:  No Known Allergies  Social History:   Social History   Substance Use Topics   • Smoking status: Never Smoker   • Smokeless tobacco: Not on file   • Alcohol use Yes      Comment: approx 30 + beers in last 24 hours 0.5 to 1 gallon of vodka daily last 6  "months prior 1 gallon/wk  daily 7 months      Family History:  Family History   Problem Relation Age of Onset   • Cancer Mother    • Depression Maternal Aunt           Review of Systems  See history of present illness and past medical history.  Limited because of his somnolence status at this time.      Vitals:   /79 (BP Location: Right arm, Patient Position: Lying)  Pulse 92  Temp 98.9 °F (37.2 °C) (Oral)   Resp 19  Ht 69\" (175.3 cm)  Wt 200 lb (90.7 kg)  SpO2 92%  BMI 29.53 kg/m2  I/O: No intake or output data in the 24 hours ending 09/19/17 1031  Exam:  General Appearance:    Somnolent and does make faces and tries to withdraw and push hands if he is touched but does not engage.     Head:    Normocephalic, without obvious abnormality, atraumatic   Eyes:    PERRL, conjunctiva/corneas clear, EOM's intact, both eyes   Ears:    Normal external ear canals, both ears   Nose:   Nares normal, septum midline, mucosa normal, no drainage    or sinus tenderness   Throat:   Dry oral mucosa and has no obvious bleeding or injury in his mouth noted    Neck:   Supple, symmetrical, trachea midline, no adenopathy;     thyroid:  no enlargement/tenderness/nodules; no carotid    bruit or JVD   Back:     Symmetric, no curvature, ROM normal, no CVA tenderness   Lungs:     Clear to auscultation bilaterally, respirations unlabored   Chest Wall:    No tenderness or deformity    Heart:    Regular rate and rhythm, S1 and S2 normal, no murmur, rub   or gallop   Abdomen:     Soft, Mild generalized tenderness noted bowel noted questionable ascites., bowel sounds active all four quadrants,     no masses, no hepatomegaly, no splenomegaly   Extremities:   Extremities normal, atraumatic, no cyanosis or edema   Pulses:   Pulses palpable in all extremities; symmetric all extremities   Skin:   Skin color normal, Skin is warm and dry,  no rashes or palpable lesions   Neurologic:   Spontaneously moving arms and legs.  Does not appear to " have any focal deficit.  She is very somnolent as mentioned.       Data Review:      I reviewed the patient's new clinical results.    Results from last 7 days  Lab Units 09/19/17  0903 09/19/17  0349 09/13/17  0737   WBC 10*3/mm3 4.42* 6.86 5.67   HEMOGLOBIN g/dL 11.1* 12.2* 12.7*   PLATELETS 10*3/mm3 90* 142 78*       Results from last 7 days  Lab Units 09/19/17  0903 09/19/17  0349 09/13/17  0737   SODIUM mmol/L 149* 146* 141   POTASSIUM mmol/L 3.7 3.9 3.5   CHLORIDE mmol/L 108* 103 104   CO2 mmol/L 26.3 27.3 24.0   BUN mg/dL 4* 4* 9   CREATININE mg/dL 0.71* 0.79 0.72*   CALCIUM mg/dL 8.0* 8.8 8.4*   GLUCOSE mg/dL 143* 129* 124*       Assessment:  Active Hospital Problems (** Indicates Principal Problem)    Diagnosis Date Noted   • **Alcohol withdrawal [F10.239] 08/06/2016   • Paroxysmal a-fib [I48.0] 09/19/2017   • Medical non-compliance [Z91.19] 09/19/2017   • Pancytopenia [D61.818] 09/11/2017   • Alcoholic cirrhosis [K70.30] 08/11/2016     Seen on CT- had not had biopsy      • Alcohol abuse [F10.10] 08/06/2016   • Benign essential HTN [I10] 08/06/2016   • History of seizure [Z87.898] 08/06/2016      Resolved Hospital Problems    Diagnosis Date Noted Date Resolved   No resolved problems to display.       Plan:  Admit the patient, continue with IV thiamine, keep an eye on his hemodynamics and oxygen saturation and watch for aspiration precautions until his mental status is improved.  We will check CT scan of his head to make sure that there is no subdural hematoma as he is an unreliable historian and has risks for fall.  We'll get access involved and intervene as his condition evolves based on his changing needs.  I'll follow CIWA protocol.  See orders.    Willie Wylie MD   9/19/2017  10:31 AM  Much of this encounter note is an electronic transcription/translation of spoken language to printed text. The electronic translation of spoken language may permit erroneous, or at times, nonsensical words or phrases to  be inadvertently transcribed; Although I have reviewed the note for such errors, some may still exist

## 2017-09-19 NOTE — ED PROVIDER NOTES
EMERGENCY DEPARTMENT ENCOUNTER    CHIEF COMPLAINT  Chief Complaint: Alcohol Problem  History given by: Patient  History limited by:   Room Number: 19/19  PMD: Omer Santos MD      HPI:  Pt is a 57 y.o. male who presents complaining of alcohol problem. Pt reports that he has been binge drinking for the past 4 days. He states that he has been drinking approximately 1 gallon of EtOH daily and had 30+ beers in the last day. Pt was recently seen and admitted for Afib due to EtOH and was discharged 5 days ago. He states that he has been tremulous. Pt has not been med compliant since discharge.    Duration: ongoing  Onset: gradual  Timing: constant  Quality: alcohol abuse  Intensity/Severity: severe  Progression: unchanged  Associated Symptoms: tremor  Aggravating Factors: EtOH  Alleviating Factors: none  Previous Episodes: hx of EtOH abuse  Treatment before arrival: discharged 5 days ago after an admission for EtOH and afib.     PAST MEDICAL HISTORY  Active Ambulatory Problems     Diagnosis Date Noted   • Alcohol withdrawal 08/06/2016   • Benign essential HTN 08/06/2016   • Alcohol abuse 08/06/2016   • History of seizure 08/06/2016   • Alcoholic cirrhosis 08/11/2016   • Lymphocytosis 09/10/2017   • Thrombocytopenia 09/11/2017   • Pancytopenia 09/11/2017     Resolved Ambulatory Problems     Diagnosis Date Noted   • Atrial fibrillation with RVR 09/10/2017   • DTs (delirium tremens) 09/10/2017   • Abdominal pain 09/11/2017     Past Medical History:   Diagnosis Date   • Alcohol abuse    • Alcohol withdrawal seizure    • Alcoholic cirrhosis 8/11/2016   • Alcoholism    • Depression    • Hypertension        PAST SURGICAL HISTORY  History reviewed. No pertinent surgical history.    FAMILY HISTORY  Family History   Problem Relation Age of Onset   • Cancer Mother    • Depression Maternal Aunt        SOCIAL HISTORY  Social History     Social History   • Marital status:      Spouse name: N/A   • Number of children:  N/A   • Years of education: N/A     Occupational History   • Not on file.     Social History Main Topics   • Smoking status: Never Smoker   • Smokeless tobacco: Not on file   • Alcohol use Yes      Comment: approx 30 + beers in last 24 hours 0.5 to 1 gallon of vodka daily last 6 months prior 1 gallon/wk  daily 7 months   • Drug use: Yes   • Sexual activity: No     Other Topics Concern   • Not on file     Social History Narrative       ALLERGIES  Review of patient's allergies indicates no known allergies.    REVIEW OF SYSTEMS  Review of Systems   Constitutional: Negative for activity change, appetite change and fever.   HENT: Negative for congestion and sore throat.    Eyes: Negative.    Respiratory: Negative for cough and shortness of breath.    Cardiovascular: Negative for chest pain and leg swelling.   Gastrointestinal: Negative for abdominal pain, diarrhea and vomiting.   Endocrine: Negative.    Genitourinary: Negative for decreased urine volume and dysuria.   Musculoskeletal: Negative for neck pain.   Skin: Negative for rash and wound.   Allergic/Immunologic: Negative.    Neurological: Positive for tremors. Negative for weakness, numbness and headaches.   Hematological: Negative.    Psychiatric/Behavioral: Negative.         Substance abuse   All other systems reviewed and are negative.      PHYSICAL EXAM  ED Triage Vitals   Temp Heart Rate Resp BP SpO2   09/19/17 0337 09/19/17 0332 09/19/17 0332 09/19/17 0409 09/19/17 0332   99.5 °F (37.5 °C) 124 22 114/100 97 %      Temp src Heart Rate Source Patient Position BP Location FiO2 (%)   09/19/17 0337 09/19/17 0332 09/19/17 0409 09/19/17 0409 --   Tympanic Monitor Lying Right arm        Physical Exam   Constitutional: He is oriented to person, place, and time and well-developed, well-nourished, and in no distress.   HENT:   Head: Normocephalic and atraumatic.   Eyes: EOM are normal. Pupils are equal, round, and reactive to light.   Neck: Normal range of motion. Neck  supple.   Cardiovascular: Regular rhythm and normal heart sounds.  Tachycardia present.    Pulmonary/Chest: Effort normal and breath sounds normal. No respiratory distress.   Abdominal: Soft. There is no tenderness. There is no rebound and no guarding.   Musculoskeletal: Normal range of motion. He exhibits no edema.   Neurological: He is alert and oriented to person, place, and time. He has normal sensation and normal strength. He displays tremor.   Skin: Skin is warm and dry.   Psychiatric: Mood and affect normal.   Nursing note and vitals reviewed.      LAB RESULTS  Lab Results (last 24 hours)     Procedure Component Value Units Date/Time    Ethanol [[id number]]  (Abnormal) Collected:  09/19/17 0349    Specimen:  Blood Updated:  09/19/17 0417     Ethanol 401 (C) mg/dL      Ethanol % 0.401 %     CBC & Differential [[id number]] Collected:  09/19/17 0349    Specimen:  Blood Updated:  09/19/17 0611    Narrative:       The following orders were created for panel order CBC & Differential.  Procedure                               Abnormality         Status                     ---------                               -----------         ------                     Scan Slide[[id number]]                   Normal              Final result               CBC Auto Differential[[id number]]        Abnormal            Final result                 Please view results for these tests on the individual orders.    Comprehensive Metabolic Panel [[id number]]  (Abnormal) Collected:  09/19/17 0349    Specimen:  Blood Updated:  09/19/17 0441     Glucose 129 (H) mg/dL      BUN 4 (L) mg/dL      Creatinine 0.79 mg/dL      Sodium 146 (H) mmol/L      Potassium 3.9 mmol/L      Chloride 103 mmol/L      CO2 27.3 mmol/L      Calcium 8.8 mg/dL      Total Protein 8.1 g/dL      Albumin 4.30 g/dL      ALT (SGPT) 59 (H) U/L      AST (SGOT) 95 (H) U/L      Alkaline Phosphatase 76 U/L      Total Bilirubin 0.4 mg/dL      eGFR Non African Amer 101 mL/min/1.73       Globulin 3.8 gm/dL      A/G Ratio 1.1 g/dL      BUN/Creatinine Ratio 5.1 (L)     Anion Gap 15.7 mmol/L     CBC Auto Differential [[id number]]  (Abnormal) Collected:  09/19/17 0349    Specimen:  Blood Updated:  09/19/17 0611     WBC 6.86 10*3/mm3      RBC 3.95 (L) 10*6/mm3      Hemoglobin 12.2 (L) g/dL      Hematocrit 36.3 (L) %      MCV 91.9 fL      MCH 30.9 pg      MCHC 33.6 g/dL      RDW 15.5 (H) %      RDW-SD 52.7 fl      MPV 9.7 fL      Platelets 142 10*3/mm3      Neutrophil % 27.2 (L) %      Lymphocyte % 58.3 (H) %      Monocyte % 13.4 (H) %      Eosinophil % 1.0 %      Basophil % 0.1 %      Immature Grans % 0.0 %      Neutrophils, Absolute 1.86 (L) 10*3/mm3      Lymphocytes, Absolute 4.00 10*3/mm3      Monocytes, Absolute 0.92 10*3/mm3      Eosinophils, Absolute 0.07 10*3/mm3      Basophils, Absolute 0.01 10*3/mm3      Immature Grans, Absolute 0.00 10*3/mm3     Scan Slide [[id number]]  (Normal) Collected:  09/19/17 0349    Specimen:  Blood Updated:  09/19/17 0611     RBC Morphology Normal     WBC Morphology Normal     Platelet Morphology Normal          I ordered the above labs and reviewed the results    PROCEDURES  Procedures      PROGRESS AND CONSULTS  ED Course   4:25 AM  Ordered blood work. Ordered Banana bag and Ativan  Time 5:24 AM  Discussed case with Dr Spann (Hospitalist)  Reviewed history, exam, results and treatments.  Discussed concerns and plan of care. Dr Spann accepts pt to be admitted to telemetry.      MEDICAL DECISION MAKING      MDM  Number of Diagnoses or Management Options     Amount and/or Complexity of Data Reviewed  Clinical lab tests: ordered and reviewed (EtOH 401)           DIAGNOSIS  Final diagnoses:   Alcohol withdrawal, with unspecified complication   Alcohol intoxication, with unspecified complication       DISPOSITION  ADMISSION    Discussed treatment plan and reason for admission with pt/family and admitting physician.  Pt/family voiced understanding of the plan for  admission for further testing/treatment as needed.         Latest Documented Vital Signs:  As of 6:49 AM  BP- 130/80 HR- 107 Temp- 99.5 °F (37.5 °C) (Tympanic) O2 sat- 93%    --  Documentation assistance provided by andrew Rae for Dr. Newby.  Information recorded by the scribe was done at my direction and has been verified and validated by me.     Tino Rae  09/19/17 0529       Pablo Newby MD  09/19/17 0649

## 2017-09-19 NOTE — ED TRIAGE NOTES
Released last Wednesday from here and has been drinking non stop since Friday/ has not been taking meds

## 2017-09-20 LAB
ALBUMIN SERPL-MCNC: 3.9 G/DL (ref 3.5–5.2)
ALBUMIN/GLOB SERPL: 1.1 G/DL
ALP SERPL-CCNC: 84 U/L (ref 39–117)
ALT SERPL W P-5'-P-CCNC: 45 U/L (ref 1–41)
ANION GAP SERPL CALCULATED.3IONS-SCNC: 12.9 MMOL/L
AST SERPL-CCNC: 68 U/L (ref 1–40)
BASOPHILS # BLD AUTO: 0.01 10*3/MM3 (ref 0–0.2)
BASOPHILS NFR BLD AUTO: 0.3 % (ref 0–1.5)
BILIRUB SERPL-MCNC: 1.2 MG/DL (ref 0.1–1.2)
BUN BLD-MCNC: 5 MG/DL (ref 6–20)
BUN/CREAT SERPL: 7.2 (ref 7–25)
CALCIUM SPEC-SCNC: 7.7 MG/DL (ref 8.6–10.5)
CHLORIDE SERPL-SCNC: 100 MMOL/L (ref 98–107)
CO2 SERPL-SCNC: 25.1 MMOL/L (ref 22–29)
CREAT BLD-MCNC: 0.69 MG/DL (ref 0.76–1.27)
DEPRECATED RDW RBC AUTO: 51 FL (ref 37–54)
EOSINOPHIL # BLD AUTO: 0.03 10*3/MM3 (ref 0–0.7)
EOSINOPHIL NFR BLD AUTO: 1 % (ref 0.3–6.2)
ERYTHROCYTE [DISTWIDTH] IN BLOOD BY AUTOMATED COUNT: 15.1 % (ref 11.5–14.5)
GFR SERPL CREATININE-BSD FRML MDRD: 118 ML/MIN/1.73
GLOBULIN UR ELPH-MCNC: 3.6 GM/DL
GLUCOSE BLD-MCNC: 117 MG/DL (ref 65–99)
HCT VFR BLD AUTO: 34.5 % (ref 40.4–52.2)
HGB BLD-MCNC: 11.4 G/DL (ref 13.7–17.6)
IMM GRANULOCYTES # BLD: 0 10*3/MM3 (ref 0–0.03)
IMM GRANULOCYTES NFR BLD: 0 % (ref 0–0.5)
LYMPHOCYTES # BLD AUTO: 1.47 10*3/MM3 (ref 0.9–4.8)
LYMPHOCYTES NFR BLD AUTO: 48.5 % (ref 19.6–45.3)
MCH RBC QN AUTO: 30.5 PG (ref 27–32.7)
MCHC RBC AUTO-ENTMCNC: 33 G/DL (ref 32.6–36.4)
MCV RBC AUTO: 92.2 FL (ref 79.8–96.2)
MONOCYTES # BLD AUTO: 0.43 10*3/MM3 (ref 0.2–1.2)
MONOCYTES NFR BLD AUTO: 14.2 % (ref 5–12)
NEUTROPHILS # BLD AUTO: 1.09 10*3/MM3 (ref 1.9–8.1)
NEUTROPHILS NFR BLD AUTO: 36 % (ref 42.7–76)
PLATELET # BLD AUTO: 70 10*3/MM3 (ref 140–500)
PMV BLD AUTO: 9.6 FL (ref 6–12)
POTASSIUM BLD-SCNC: 3.4 MMOL/L (ref 3.5–5.2)
PROT SERPL-MCNC: 7.5 G/DL (ref 6–8.5)
RBC # BLD AUTO: 3.74 10*6/MM3 (ref 4.6–6)
SODIUM BLD-SCNC: 138 MMOL/L (ref 136–145)
WBC NRBC COR # BLD: 3.03 10*3/MM3 (ref 4.5–10.7)

## 2017-09-20 PROCEDURE — 25010000002 MORPHINE PER 10 MG: Performed by: INTERNAL MEDICINE

## 2017-09-20 PROCEDURE — 25010000002 MAGNESIUM SULFATE PER 500 MG OF MAGNESIUM: Performed by: INTERNAL MEDICINE

## 2017-09-20 PROCEDURE — 85025 COMPLETE CBC W/AUTO DIFF WBC: CPT | Performed by: INTERNAL MEDICINE

## 2017-09-20 PROCEDURE — 25010000002 ENOXAPARIN PER 10 MG: Performed by: INTERNAL MEDICINE

## 2017-09-20 PROCEDURE — 80053 COMPREHEN METABOLIC PANEL: CPT | Performed by: INTERNAL MEDICINE

## 2017-09-20 PROCEDURE — 25010000002 ONDANSETRON PER 1 MG: Performed by: INTERNAL MEDICINE

## 2017-09-20 PROCEDURE — 25010000002 LORAZEPAM PER 2 MG: Performed by: INTERNAL MEDICINE

## 2017-09-20 PROCEDURE — 25010000002 THIAMINE PER 100 MG: Performed by: INTERNAL MEDICINE

## 2017-09-20 RX ORDER — METOPROLOL SUCCINATE 50 MG/1
50 TABLET, EXTENDED RELEASE ORAL EVERY 12 HOURS SCHEDULED
Status: DISCONTINUED | OUTPATIENT
Start: 2017-09-20 | End: 2017-09-23 | Stop reason: HOSPADM

## 2017-09-20 RX ORDER — AMLODIPINE BESYLATE 5 MG/1
5 TABLET ORAL
Status: DISCONTINUED | OUTPATIENT
Start: 2017-09-20 | End: 2017-09-23 | Stop reason: HOSPADM

## 2017-09-20 RX ORDER — POTASSIUM CHLORIDE 750 MG/1
20 CAPSULE, EXTENDED RELEASE ORAL ONCE
Status: COMPLETED | OUTPATIENT
Start: 2017-09-20 | End: 2017-09-20

## 2017-09-20 RX ADMIN — MORPHINE SULFATE 2 MG: 2 INJECTION, SOLUTION INTRAMUSCULAR; INTRAVENOUS at 19:21

## 2017-09-20 RX ADMIN — MORPHINE SULFATE 2 MG: 2 INJECTION, SOLUTION INTRAMUSCULAR; INTRAVENOUS at 08:20

## 2017-09-20 RX ADMIN — SODIUM CHLORIDE 50 ML/HR: 9 INJECTION, SOLUTION INTRAVENOUS at 17:53

## 2017-09-20 RX ADMIN — FAMOTIDINE 40 MG: 10 INJECTION, SOLUTION INTRAVENOUS at 08:46

## 2017-09-20 RX ADMIN — POTASSIUM CHLORIDE 20 MEQ: 750 CAPSULE, EXTENDED RELEASE ORAL at 17:22

## 2017-09-20 RX ADMIN — SODIUM CHLORIDE 100 ML/HR: 9 INJECTION, SOLUTION INTRAVENOUS at 03:58

## 2017-09-20 RX ADMIN — FOLIC ACID 100 ML/HR: 5 INJECTION, SOLUTION INTRAMUSCULAR; INTRAVENOUS; SUBCUTANEOUS at 08:46

## 2017-09-20 RX ADMIN — LORAZEPAM 1 MG: 2 INJECTION INTRAMUSCULAR; INTRAVENOUS at 00:50

## 2017-09-20 RX ADMIN — METOPROLOL SUCCINATE 50 MG: 50 TABLET, FILM COATED, EXTENDED RELEASE ORAL at 08:44

## 2017-09-20 RX ADMIN — LORAZEPAM 1 MG: 2 INJECTION INTRAMUSCULAR; INTRAVENOUS at 03:05

## 2017-09-20 RX ADMIN — MORPHINE SULFATE 2 MG: 2 INJECTION, SOLUTION INTRAMUSCULAR; INTRAVENOUS at 03:58

## 2017-09-20 RX ADMIN — LORAZEPAM 1 MG: 2 INJECTION INTRAMUSCULAR; INTRAVENOUS at 16:48

## 2017-09-20 RX ADMIN — MORPHINE SULFATE 2 MG: 2 INJECTION, SOLUTION INTRAMUSCULAR; INTRAVENOUS at 00:04

## 2017-09-20 RX ADMIN — ALUMINUM HYDROXIDE, MAGNESIUM HYDROXIDE, AND DIMETHICONE 15 ML: 400; 400; 40 SUSPENSION ORAL at 22:57

## 2017-09-20 RX ADMIN — AMLODIPINE BESYLATE 5 MG: 5 TABLET ORAL at 17:21

## 2017-09-20 RX ADMIN — ONDANSETRON 4 MG: 2 INJECTION INTRAMUSCULAR; INTRAVENOUS at 03:58

## 2017-09-20 RX ADMIN — VENLAFAXINE HYDROCHLORIDE 150 MG: 150 CAPSULE, EXTENDED RELEASE ORAL at 08:46

## 2017-09-20 RX ADMIN — FAMOTIDINE 20 MG: 10 INJECTION, SOLUTION INTRAVENOUS at 21:21

## 2017-09-20 RX ADMIN — LORAZEPAM 2 MG: 2 INJECTION INTRAMUSCULAR; INTRAVENOUS at 07:49

## 2017-09-20 RX ADMIN — LORAZEPAM 1 MG: 2 INJECTION INTRAMUSCULAR; INTRAVENOUS at 05:34

## 2017-09-20 RX ADMIN — MORPHINE SULFATE 2 MG: 2 INJECTION, SOLUTION INTRAMUSCULAR; INTRAVENOUS at 13:45

## 2017-09-20 RX ADMIN — ONDANSETRON 4 MG: 2 INJECTION INTRAMUSCULAR; INTRAVENOUS at 10:35

## 2017-09-20 RX ADMIN — ENOXAPARIN SODIUM 40 MG: 40 INJECTION SUBCUTANEOUS at 08:44

## 2017-09-20 RX ADMIN — METOPROLOL SUCCINATE 50 MG: 50 TABLET, FILM COATED, EXTENDED RELEASE ORAL at 21:21

## 2017-09-20 RX ADMIN — LORAZEPAM 2 MG: 2 INJECTION INTRAMUSCULAR; INTRAVENOUS at 10:35

## 2017-09-20 NOTE — PROGRESS NOTES
Discharge Planning Assessment  [facility]     Patient Name: Ton Edwards  MRN: [id number]  Today's Date: 9/20/2017    Admit Date: 9/19/2017          Discharge Needs Assessment       09/20/17 1030    Living Environment    Lives With significant other    Living Arrangements house    Home Accessibility no concerns    Stair Railings at Home none    Type of Financial/Environmental Concern none    Transportation Available family or friend will provide;car    Living Environment    Quality Of Family Relationships supportive;helpful;involved    Discharge Needs Assessment    Concerns To Be Addressed denies needs/concerns at this time    Anticipated Changes Related to Illness none    Equipment Currently Used at Home none    Current Discharge Risk substance abuse    Discharge Disposition still a patient            Discharge Plan       09/20/17 1030    Case Management/Social Work Plan    Plan Home    Additional Comments Met with pt at bedside and verified info on facesheet. Pt lives with s/o Mari ([phone]) and reports being IADL's. Pt uses no DME. Pt doesn't drive but denies transportation concerns (family, friends). Confirmed PCP and pharmacy and denies affording medications. Pt denies previous HH or skilled rehab services. Pt has attended substance abuse treatment in the past - Access following currently. Pt plans to return home at discharge  and currently denies CCP needs. Informs Mari will be available to assist as needed. CCP will continue to follow for needs that may arise................GILLIAN Orosco, CCP        Discharge Placement     No information found                Demographic Summary       09/20/17 1028    Referral Information    Admission Type inpatient    Reason For Consult discharge planning    Record Reviewed medical record    Contact Information    Permission Granted to Share Information With family/designee    Comments s/o Mari Lo ([phone])            Functional Status       09/20/17  1029    Functional Status Current    Ambulation 2-->assistive person    Transferring 2-->assistive person    Toileting 2-->assistive person    Bathing 2-->assistive person    Dressing 2-->assistive person    Eating 0-->independent    Functional Status Prior    Ambulation 0-->independent    Transferring 0-->independent    Toileting 0-->independent    Bathing 0-->independent    Dressing 0-->independent    Eating 0-->independent            Psychosocial     None            Abuse/Neglect     None            Legal     None            Substance Abuse     None            Patient Forms       09/20/17 1028    Patient Forms    Important Message from Medicare (IMM) Delivered    Delivered to Patient    Method of delivery In person          Diego Alberto RN

## 2017-09-20 NOTE — PLAN OF CARE
Problem: Patient Care Overview (Adult)  Goal: Plan of Care Review  Outcome: Ongoing (interventions implemented as appropriate)    09/20/17 0510   Coping/Psychosocial Response Interventions   Plan Of Care Reviewed With patient   Patient Care Overview   Progress no change   Outcome Evaluation   Outcome Summary/Follow up Plan ADELINEWA 13-18. Ativan PRN about Q2 hour. C/O abd pain and nausea.        Goal: Adult Individualization and Mutuality  Outcome: Ongoing (interventions implemented as appropriate)  Goal: Discharge Needs Assessment  Outcome: Ongoing (interventions implemented as appropriate)    Problem: Acute Alcohol Withdrawal Syndrome, Risk For/Actual (Adult)  Goal: Signs and Symptoms of Listed Potential Problems Will be Absent or Manageable (Acute Alcohol Withdrawal Syndrome, Risk For/Actual)  Outcome: Ongoing (interventions implemented as appropriate)    Problem: Skin Integrity Impairment, Risk/Actual (Adult)  Goal: Identify Related Risk Factors and Signs and Symptoms  Outcome: Outcome(s) achieved Date Met:  09/20/17  Goal: Skin Integrity/Wound Healing  Outcome: Ongoing (interventions implemented as appropriate)    Problem: Fall Risk (Adult)  Goal: Identify Related Risk Factors and Signs and Symptoms  Outcome: Outcome(s) achieved Date Met:  09/20/17  Goal: Absence of Falls  Outcome: Ongoing (interventions implemented as appropriate)

## 2017-09-20 NOTE — PROGRESS NOTES
"     LOS: 1 day   Primary Care Physician: Omer Santos MD     Subjective   Sleeping when I first came in.  Awakens easily.  Said he doesn't feel good.  Belly is sore.  Did eat lunch.  No nausea or vomiting.  Not shaky or sweaty.    Vital Signs  Body mass index is 29.53 kg/(m^2).  Temp:  [97.8 °F (36.6 °C)-98.5 °F (36.9 °C)] 98.5 °F (36.9 °C)  Heart Rate:  [90-98] 90  Resp:  [20] 20  BP: (172-195)/() 195/105      Objective:  General Appearance:  In no acute distress.    Vital signs: (most recent): Blood pressure (!) 195/105, pulse 90, temperature 98.5 °F (36.9 °C), temperature source Oral, resp. rate 20, height 69\" (175.3 cm), weight 200 lb (90.7 kg), SpO2 94 %.    Lungs:  There are decreased breath sounds.  No wheezes, rales or rhonchi.    Heart: Normal rate.  Regular rhythm.  No murmur.   Abdomen: Abdomen is soft and non-distended.  (Obese)Bowel sounds are normal.   (Minimally tender).   There is no splenomegaly. There is no hepatomegaly.   Extremities: There is no dependent edema.  (No tremor with the hands outstretched)  Neurological: Patient is alert.          Results Review:    I reviewed the patient's new clinical results.     CT head without hematoma or bleeding      Results from last 7 days  Lab Units 09/20/17  0634 09/19/17  0903   WBC 10*3/mm3 3.03* 4.42*   HEMOGLOBIN g/dL 11.4* 11.1*   PLATELETS 10*3/mm3 70* 90*       Results from last 7 days  Lab Units 09/20/17  0634 09/19/17  0903   SODIUM mmol/L 138 149*   POTASSIUM mmol/L 3.4* 3.7   CHLORIDE mmol/L 100 108*   CO2 mmol/L 25.1 26.3   BUN mg/dL 5* 4*   CREATININE mg/dL 0.69* 0.71*   CALCIUM mg/dL 7.7* 8.0*   GLUCOSE mg/dL 117* 143*         Hemoglobin A1C:  Lab Results   Component Value Date    HGBA1C 6.27 (H) 08/07/2016       Glucose Range:No results found for: POCGLU    No results found for: CKQAIZIS28    Lab Results   Component Value Date    TSH 2.220 08/10/2016       Assessment & Plan      Medication Review: Yes    Active Hospital " Problems (** Indicates Principal Problem)    Diagnosis Date Noted   • **Alcohol withdrawal [F10.239] 08/06/2016   • Paroxysmal a-fib [I48.0] 09/19/2017   • Medical non-compliance [Z91.19] 09/19/2017   • Pancytopenia [D61.818] 09/11/2017   • Alcoholic cirrhosis [K70.30] 08/11/2016   • Alcohol abuse [F10.10] 08/06/2016   • Benign essential HTN [I10] 08/06/2016   • History of seizure [Z87.898] 08/06/2016      Resolved Hospital Problems    Diagnosis Date Noted Date Resolved   No resolved problems to display.       Assessment/Plan  1.  Alcoholism with intoxication at admission.  Blood pressure is up but no diaphoresis or tachycardia.  He is on metoprolol.  CIWA protocol as needed.  Consult access Center.  Decrease IV fluids as he is tolerating a diet  2.  Hypertension, probably essential.  Increase metoprolol.  Add Norvasc.  Follow  3.  Thrombocytopenia, platelets were normal on 9/10/17 but only 81,000 on 9/11/17.  He is on Lovenox for DVT prophylaxis.  Pepcid was also started.  Change Lovenox to SCDs and RAUL hose.  Pancytopenia is present.  Splenomegaly noted on CT abdomen 9/11/17  4.  Hypokalemia.  20 mEq ordered.  Recheck in a.m. with magnesium level    Samantha Cazares MD  09/20/17  4:36 PM

## 2017-09-21 LAB
ALBUMIN SERPL-MCNC: 3.9 G/DL (ref 3.5–5.2)
ALBUMIN/GLOB SERPL: 0.9 G/DL
ALP SERPL-CCNC: 129 U/L (ref 39–117)
ALT SERPL W P-5'-P-CCNC: 41 U/L (ref 1–41)
ANION GAP SERPL CALCULATED.3IONS-SCNC: 14.6 MMOL/L
AST SERPL-CCNC: 57 U/L (ref 1–40)
BILIRUB SERPL-MCNC: 1.3 MG/DL (ref 0.1–1.2)
BUN BLD-MCNC: 7 MG/DL (ref 6–20)
BUN/CREAT SERPL: 10.1 (ref 7–25)
CALCIUM SPEC-SCNC: 8.8 MG/DL (ref 8.6–10.5)
CHLORIDE SERPL-SCNC: 98 MMOL/L (ref 98–107)
CO2 SERPL-SCNC: 21.4 MMOL/L (ref 22–29)
CREAT BLD-MCNC: 0.69 MG/DL (ref 0.76–1.27)
DEPRECATED RDW RBC AUTO: 47.1 FL (ref 37–54)
ERYTHROCYTE [DISTWIDTH] IN BLOOD BY AUTOMATED COUNT: 14.5 % (ref 11.5–14.5)
GFR SERPL CREATININE-BSD FRML MDRD: 118 ML/MIN/1.73
GLOBULIN UR ELPH-MCNC: 4.3 GM/DL
GLUCOSE BLD-MCNC: 116 MG/DL (ref 65–99)
HCT VFR BLD AUTO: 40.3 % (ref 40.4–52.2)
HGB BLD-MCNC: 13.5 G/DL (ref 13.7–17.6)
MCH RBC QN AUTO: 30.2 PG (ref 27–32.7)
MCHC RBC AUTO-ENTMCNC: 33.5 G/DL (ref 32.6–36.4)
MCV RBC AUTO: 90.2 FL (ref 79.8–96.2)
PLATELET # BLD AUTO: 110 10*3/MM3 (ref 140–500)
PMV BLD AUTO: 10.2 FL (ref 6–12)
POTASSIUM BLD-SCNC: 3.8 MMOL/L (ref 3.5–5.2)
PROT SERPL-MCNC: 8.2 G/DL (ref 6–8.5)
RBC # BLD AUTO: 4.47 10*6/MM3 (ref 4.6–6)
SODIUM BLD-SCNC: 134 MMOL/L (ref 136–145)
WBC NRBC COR # BLD: 5.15 10*3/MM3 (ref 4.5–10.7)

## 2017-09-21 PROCEDURE — 25010000002 MORPHINE PER 10 MG: Performed by: INTERNAL MEDICINE

## 2017-09-21 PROCEDURE — 25010000002 MAGNESIUM SULFATE PER 500 MG OF MAGNESIUM: Performed by: INTERNAL MEDICINE

## 2017-09-21 PROCEDURE — 25010000002 THIAMINE PER 100 MG: Performed by: INTERNAL MEDICINE

## 2017-09-21 PROCEDURE — 25010000002 MORPHINE SULFATE (PF) 2 MG/ML SOLUTION: Performed by: INTERNAL MEDICINE

## 2017-09-21 PROCEDURE — 90791 PSYCH DIAGNOSTIC EVALUATION: CPT | Performed by: SOCIAL WORKER

## 2017-09-21 PROCEDURE — 25010000002 ONDANSETRON PER 1 MG: Performed by: INTERNAL MEDICINE

## 2017-09-21 PROCEDURE — 85027 COMPLETE CBC AUTOMATED: CPT | Performed by: INTERNAL MEDICINE

## 2017-09-21 PROCEDURE — 25010000002 LORAZEPAM PER 2 MG: Performed by: INTERNAL MEDICINE

## 2017-09-21 PROCEDURE — 80053 COMPREHEN METABOLIC PANEL: CPT | Performed by: INTERNAL MEDICINE

## 2017-09-21 RX ORDER — FAMOTIDINE 20 MG/1
20 TABLET, FILM COATED ORAL 2 TIMES DAILY
Status: DISCONTINUED | OUTPATIENT
Start: 2017-09-21 | End: 2017-09-23 | Stop reason: HOSPADM

## 2017-09-21 RX ORDER — ACETAMINOPHEN 325 MG/1
650 TABLET ORAL EVERY 4 HOURS PRN
Status: DISCONTINUED | OUTPATIENT
Start: 2017-09-21 | End: 2017-09-23 | Stop reason: HOSPADM

## 2017-09-21 RX ORDER — TRAMADOL HYDROCHLORIDE 50 MG/1
50 TABLET ORAL EVERY 6 HOURS PRN
Status: DISCONTINUED | OUTPATIENT
Start: 2017-09-21 | End: 2017-09-23 | Stop reason: HOSPADM

## 2017-09-21 RX ADMIN — MORPHINE SULFATE 2 MG: 2 INJECTION, SOLUTION INTRAMUSCULAR; INTRAVENOUS at 04:28

## 2017-09-21 RX ADMIN — FAMOTIDINE 20 MG: 20 TABLET, FILM COATED ORAL at 20:34

## 2017-09-21 RX ADMIN — LORAZEPAM 1 MG: 2 INJECTION INTRAMUSCULAR; INTRAVENOUS at 10:03

## 2017-09-21 RX ADMIN — MORPHINE SULFATE 2 MG: 2 INJECTION, SOLUTION INTRAMUSCULAR; INTRAVENOUS at 17:12

## 2017-09-21 RX ADMIN — FAMOTIDINE 40 MG: 10 INJECTION, SOLUTION INTRAVENOUS at 08:50

## 2017-09-21 RX ADMIN — FAMOTIDINE 20 MG: 10 INJECTION, SOLUTION INTRAVENOUS at 00:58

## 2017-09-21 RX ADMIN — FOLIC ACID 100 ML/HR: 5 INJECTION, SOLUTION INTRAMUSCULAR; INTRAVENOUS; SUBCUTANEOUS at 08:49

## 2017-09-21 RX ADMIN — ONDANSETRON 4 MG: 2 INJECTION INTRAMUSCULAR; INTRAVENOUS at 23:56

## 2017-09-21 RX ADMIN — AMLODIPINE BESYLATE 5 MG: 5 TABLET ORAL at 08:49

## 2017-09-21 RX ADMIN — METOPROLOL SUCCINATE 50 MG: 50 TABLET, FILM COATED, EXTENDED RELEASE ORAL at 20:36

## 2017-09-21 RX ADMIN — ACETAMINOPHEN 650 MG: 325 TABLET ORAL at 06:27

## 2017-09-21 RX ADMIN — MORPHINE SULFATE 2 MG: 2 INJECTION, SOLUTION INTRAMUSCULAR; INTRAVENOUS at 08:50

## 2017-09-21 RX ADMIN — VENLAFAXINE HYDROCHLORIDE 150 MG: 150 CAPSULE, EXTENDED RELEASE ORAL at 08:49

## 2017-09-21 RX ADMIN — METOPROLOL SUCCINATE 50 MG: 50 TABLET, FILM COATED, EXTENDED RELEASE ORAL at 08:49

## 2017-09-21 RX ADMIN — TRAMADOL HYDROCHLORIDE 50 MG: 50 TABLET, FILM COATED ORAL at 23:56

## 2017-09-21 RX ADMIN — MORPHINE SULFATE 2 MG: 2 INJECTION, SOLUTION INTRAMUSCULAR; INTRAVENOUS at 00:27

## 2017-09-21 RX ADMIN — TRAMADOL HYDROCHLORIDE 50 MG: 50 TABLET, FILM COATED ORAL at 18:41

## 2017-09-21 RX ADMIN — ONDANSETRON 4 MG: 2 INJECTION INTRAMUSCULAR; INTRAVENOUS at 07:38

## 2017-09-21 RX ADMIN — MORPHINE SULFATE 2 MG: 2 INJECTION, SOLUTION INTRAMUSCULAR; INTRAVENOUS at 13:16

## 2017-09-21 NOTE — PLAN OF CARE
Problem: Patient Care Overview (Adult)  Goal: Plan of Care Review  Outcome: Ongoing (interventions implemented as appropriate)    09/21/17 6844   Coping/Psychosocial Response Interventions   Plan Of Care Reviewed With patient;significant other   Patient Care Overview   Progress no change   Outcome Evaluation   Outcome Summary/Follow up Plan Pt slept well through most of the night. C/o pain once and stomach acid. Gave Maalox to help with upset stomach. CIWA mostly 3 throughout the night. Did not dose Ativan. Will cont to monitor         Problem: Acute Alcohol Withdrawal Syndrome, Risk For/Actual (Adult)  Goal: Signs and Symptoms of Listed Potential Problems Will be Absent or Manageable (Acute Alcohol Withdrawal Syndrome, Risk For/Actual)  Outcome: Ongoing (interventions implemented as appropriate)    Problem: Skin Integrity Impairment, Risk/Actual (Adult)  Goal: Skin Integrity/Wound Healing  Outcome: Ongoing (interventions implemented as appropriate)    Problem: Fall Risk (Adult)  Goal: Absence of Falls  Outcome: Ongoing (interventions implemented as appropriate)

## 2017-09-21 NOTE — PROGRESS NOTES
"     LOS: 2 days   Primary Care Physician: Omer Santos MD     Subjective   Feels so-so.  Has been up and about.  Did eat lunch.  They just brought a dinner tray.  He had 5 doses of IV morphine yesterday and for today.  He says it's because of headache pain.    Vital Signs  Body mass index is 29.53 kg/(m^2).  Temp:  [97.7 °F (36.5 °C)-98.4 °F (36.9 °C)] 98 °F (36.7 °C)  Heart Rate:  [] 72  Resp:  [18-20] 20  BP: (128-195)/(91-99) 128/91      Objective:  General Appearance:  In no acute distress (Looks older than age).    Vital signs: (most recent): Blood pressure 128/91, pulse 72, temperature 98 °F (36.7 °C), temperature source Oral, resp. rate 20, height 69\" (175.3 cm), weight 200 lb (90.7 kg), SpO2 96 %.    Lungs:  There are decreased breath sounds.  No wheezes, rales or rhonchi.    Heart: Normal rate.  Regular rhythm.  No murmur.   Abdomen: Abdomen is soft and non-distended.  Bowel sounds are normal.   There is no abdominal tenderness.   There is no hepatomegaly.   Extremities: There is no dependent edema.  (No tremor of the outstretched hands.)  Neurological: Patient is alert.    Skin:  (No diaphoresis)        Results Review:    I reviewed the patient's new clinical results.      Results from last 7 days  Lab Units 09/21/17  0637 09/20/17  0634   WBC 10*3/mm3 5.15 3.03*   HEMOGLOBIN g/dL 13.5* 11.4*   PLATELETS 10*3/mm3 110* 70*       Results from last 7 days  Lab Units 09/21/17  0637 09/20/17  0634   SODIUM mmol/L 134* 138   POTASSIUM mmol/L 3.8 3.4*   CHLORIDE mmol/L 98 100   CO2 mmol/L 21.4* 25.1   BUN mg/dL 7 5*   CREATININE mg/dL 0.69* 0.69*   CALCIUM mg/dL 8.8 7.7*   GLUCOSE mg/dL 116* 117*         Hemoglobin A1C:  Lab Results   Component Value Date    HGBA1C 6.27 (H) 08/07/2016       Glucose Range:No results found for: POCGLU    No results found for: [id number]    Lab Results   Component Value Date    TSH 2.220 08/10/2016       Assessment & Plan      Medication Review: Yes    Active Hospital " Problems (** Indicates Principal Problem)    Diagnosis Date Noted   • **Alcohol withdrawal [F10.239] 08/06/2016   • Paroxysmal a-fib [I48.0] 09/19/2017   • Medical non-compliance [Z91.19] 09/19/2017   • Pancytopenia [D61.818] 09/11/2017   • Alcoholic cirrhosis [K70.30] 08/11/2016   • Alcohol abuse [F10.10] 08/06/2016   • Benign essential HTN [I10] 08/06/2016   • History of seizure [Z87.898] 08/06/2016      Resolved Hospital Problems    Diagnosis Date Noted Date Resolved   No resolved problems to display.       Assessment/Plan  1.  Alcoholism with intoxication at admission.  No evidence of withdrawal currently.  Stop IV Ativan.  I will leave oral Ativan as needed.  DC IV morphine.  Discussed with patient.  Continue to monitor for any signs of withdrawal.  He has had withdrawal seizures in the past.  2.  Probable essential hypertension.  Blood pressures are better.  Follow.  3.  Anemia and thrombocytopenia.  Counts are stable.  White blood cell count improved.  He has splenomegaly.  Lovenox DC'd.  4.  Hyperglycemia with mildly elevated A1c.  Change diet to no concentrated sweets.    Samantha Cazares MD  09/21/17  5:17 PM

## 2017-09-21 NOTE — PLAN OF CARE
Problem: Patient Care Overview (Adult)  Goal: Plan of Care Review  Outcome: Ongoing (interventions implemented as appropriate)    09/21/17 1800   Coping/Psychosocial Response Interventions   Plan Of Care Reviewed With patient   Patient Care Overview   Progress improving   Outcome Evaluation   Outcome Summary/Follow up Plan No falls. No distress noted. CIWA scores improved today. IV meds changed to po. Cont to monitor for withdrawal seizures as pt has history of in the past. Cont to monitor.       Goal: Adult Individualization and Mutuality  Outcome: Ongoing (interventions implemented as appropriate)  Goal: Discharge Needs Assessment  Outcome: Ongoing (interventions implemented as appropriate)    Problem: Acute Alcohol Withdrawal Syndrome, Risk For/Actual (Adult)  Goal: Signs and Symptoms of Listed Potential Problems Will be Absent or Manageable (Acute Alcohol Withdrawal Syndrome, Risk For/Actual)  Outcome: Ongoing (interventions implemented as appropriate)    Problem: Skin Integrity Impairment, Risk/Actual (Adult)  Goal: Skin Integrity/Wound Healing  Outcome: Ongoing (interventions implemented as appropriate)    Problem: Fall Risk (Adult)  Goal: Absence of Falls  Outcome: Ongoing (interventions implemented as appropriate)

## 2017-09-21 NOTE — CONSULTS
"58 y/o cauc male well known to [facility] due to his severe alcoholism.  He has had 2 previous consults re: his ETOH dependence.  Patient was last evaluated by [facility] on 9/11 by Ms Garza.  Patient states he went a couple days after discharge then before he drank ETOH again.  Patient states post discharge he drank so much he could not handle it any more.  On 9/19 he had an ETOH of 401 in the ED.  Patient states he went w/o ETOH for 13 months 3 years ago.  He states the has been to over \"30\" treatment programs.  He reports he is trying to get his 's license back as he had lost his license due to DUI's.  He states he walks 4 miles/day some days.  He walks to get his liquor.  Patient has been attending AA meetings.  He has a sponsor and plans on going to [place] for a meeting w/ his sponsor.  He has hx of significant w/ drawal.      Patient lives alone.  He is frustrated that his son will not talk to him b/c of his chronic ETOH use.    He has been tried on vivitrol as well as on Camprel. He states that he did well on Vivitrol but Camprel did not help him any.  He would like to find a place where he can get the Vivitrol again. He states he tried to get it through [facility] but they only \"gave me three pills\" and they did not help.  Patient lives in [facility].      He is alert and O X 4.  No SI of HI.  No a/v hallucinations. Speech is appropriate.  He was cooperative.  [facility] will follow.  His lastest CIWA scores have been low.       "

## 2017-09-22 LAB
ANION GAP SERPL CALCULATED.3IONS-SCNC: 12.3 MMOL/L
BUN BLD-MCNC: 11 MG/DL (ref 6–20)
BUN/CREAT SERPL: 15.9 (ref 7–25)
CALCIUM SPEC-SCNC: 8.8 MG/DL (ref 8.6–10.5)
CHLORIDE SERPL-SCNC: 102 MMOL/L (ref 98–107)
CO2 SERPL-SCNC: 22.7 MMOL/L (ref 22–29)
CREAT BLD-MCNC: 0.69 MG/DL (ref 0.76–1.27)
DEPRECATED RDW RBC AUTO: 47.8 FL (ref 37–54)
ERYTHROCYTE [DISTWIDTH] IN BLOOD BY AUTOMATED COUNT: 14.7 % (ref 11.5–14.5)
GFR SERPL CREATININE-BSD FRML MDRD: 118 ML/MIN/1.73
GLUCOSE BLD-MCNC: 89 MG/DL (ref 65–99)
HCT VFR BLD AUTO: 39 % (ref 40.4–52.2)
HGB BLD-MCNC: 12.9 G/DL (ref 13.7–17.6)
MCH RBC QN AUTO: 29.9 PG (ref 27–32.7)
MCHC RBC AUTO-ENTMCNC: 33.1 G/DL (ref 32.6–36.4)
MCV RBC AUTO: 90.5 FL (ref 79.8–96.2)
PLATELET # BLD AUTO: 137 10*3/MM3 (ref 140–500)
PMV BLD AUTO: 10.1 FL (ref 6–12)
POTASSIUM BLD-SCNC: 3.9 MMOL/L (ref 3.5–5.2)
RBC # BLD AUTO: 4.31 10*6/MM3 (ref 4.6–6)
SODIUM BLD-SCNC: 137 MMOL/L (ref 136–145)
WBC NRBC COR # BLD: 8.02 10*3/MM3 (ref 4.5–10.7)

## 2017-09-22 PROCEDURE — 85027 COMPLETE CBC AUTOMATED: CPT | Performed by: INTERNAL MEDICINE

## 2017-09-22 PROCEDURE — 25010000002 MAGNESIUM SULFATE PER 500 MG OF MAGNESIUM: Performed by: INTERNAL MEDICINE

## 2017-09-22 PROCEDURE — 80048 BASIC METABOLIC PNL TOTAL CA: CPT | Performed by: INTERNAL MEDICINE

## 2017-09-22 PROCEDURE — 25010000002 THIAMINE PER 100 MG: Performed by: INTERNAL MEDICINE

## 2017-09-22 RX ORDER — LORAZEPAM 0.5 MG/1
0.5 TABLET ORAL
Status: DISCONTINUED | OUTPATIENT
Start: 2017-09-22 | End: 2017-09-23 | Stop reason: HOSPADM

## 2017-09-22 RX ADMIN — FAMOTIDINE 20 MG: 20 TABLET, FILM COATED ORAL at 17:12

## 2017-09-22 RX ADMIN — ACETAMINOPHEN 650 MG: 325 TABLET ORAL at 02:59

## 2017-09-22 RX ADMIN — METOPROLOL SUCCINATE 50 MG: 50 TABLET, FILM COATED, EXTENDED RELEASE ORAL at 08:15

## 2017-09-22 RX ADMIN — VENLAFAXINE HYDROCHLORIDE 150 MG: 150 CAPSULE, EXTENDED RELEASE ORAL at 08:15

## 2017-09-22 RX ADMIN — AMLODIPINE BESYLATE 5 MG: 5 TABLET ORAL at 08:15

## 2017-09-22 RX ADMIN — ACETAMINOPHEN 650 MG: 325 TABLET ORAL at 18:08

## 2017-09-22 RX ADMIN — ACETAMINOPHEN 650 MG: 325 TABLET ORAL at 09:41

## 2017-09-22 RX ADMIN — FAMOTIDINE 20 MG: 20 TABLET, FILM COATED ORAL at 08:15

## 2017-09-22 RX ADMIN — METOPROLOL SUCCINATE 50 MG: 50 TABLET, FILM COATED, EXTENDED RELEASE ORAL at 21:54

## 2017-09-22 RX ADMIN — LORAZEPAM 2 MG: 1 TABLET ORAL at 02:58

## 2017-09-22 RX ADMIN — FOLIC ACID 100 ML/HR: 5 INJECTION, SOLUTION INTRAMUSCULAR; INTRAVENOUS; SUBCUTANEOUS at 08:15

## 2017-09-22 NOTE — PROGRESS NOTES
"     LOS: 3 days   Primary Care Physician: Omer Santos MD     Subjective   Became confused and somewhat agitated during the night.  Did get Ativan at that time.  Had a dose of Ultram last night.  Has just had Tylenol today.  He was sleeping when I first came in and then did awaken though he was drowsy.  Significant other at bedside    Vital Signs  Body mass index is 29.53 kg/(m^2).  Temp:  [97.4 °F (36.3 °C)-98.1 °F (36.7 °C)] 98.1 °F (36.7 °C)  Heart Rate:  [57-81] 57  Resp:  [18-20] 18  BP: (136-178)/(73-97) 136/73      Objective:  General Appearance:  In no acute distress (No diaphoresis).    Vital signs: (most recent): Blood pressure 136/73, pulse 57, temperature 98.1 °F (36.7 °C), temperature source Oral, resp. rate 18, height 69\" (175.3 cm), weight 200 lb (90.7 kg), SpO2 93 %.    Lungs:  There are decreased breath sounds.  No wheezes, rales or rhonchi.    Heart: Normal rate.  Regular rhythm.  No murmur.   Abdomen: Abdomen is soft and non-distended.  Bowel sounds are normal.   There is no abdominal tenderness.   There is no splenomegaly. There is no hepatomegaly.   Extremities: There is no dependent edema.  (No tremor of outstretched hands)  Skin:  Warm and dry.          Results Review:    I reviewed the patient's new clinical results.      Results from last 7 days  Lab Units 09/22/17  0634 09/21/17  0637   WBC 10*3/mm3 8.02 5.15   HEMOGLOBIN g/dL 12.9* 13.5*   PLATELETS 10*3/mm3 137* 110*       Results from last 7 days  Lab Units 09/22/17  0634 09/21/17  0637   SODIUM mmol/L 137 134*   POTASSIUM mmol/L 3.9 3.8   CHLORIDE mmol/L 102 98   CO2 mmol/L 22.7 21.4*   BUN mg/dL 11 7   CREATININE mg/dL 0.69* 0.69*   CALCIUM mg/dL 8.8 8.8   GLUCOSE mg/dL 89 116*         Hemoglobin A1C:  Lab Results   Component Value Date    HGBA1C 6.27 (H) 08/07/2016       Glucose Range:No results found for: POCGLU    No results found for: [id number]    Lab Results   Component Value Date    TSH 2.220 08/10/2016       Assessment " & Plan      Medication Review: Yes    Active Hospital Problems (** Indicates Principal Problem)    Diagnosis Date Noted   • **Alcohol withdrawal [F10.239] 08/06/2016   • Paroxysmal a-fib [I48.0] 09/19/2017   • Medical non-compliance [Z91.19] 09/19/2017   • Pancytopenia [D61.818] 09/11/2017   • Alcoholic cirrhosis [K70.30] 08/11/2016   • Alcohol abuse [F10.10] 08/06/2016   • Benign essential HTN [I10] 08/06/2016   • History of seizure [Z87.898] 08/06/2016      Resolved Hospital Problems    Diagnosis Date Noted Date Resolved   No resolved problems to display.       Assessment/Plan  1.  Alcoholism.  He had some withdrawal symptoms last night.  Decrease Ativan dosage from 2 mg to 0.5-1 mg as needed to avoid sedation.  DC IV fluids as he is tolerating his diet.  2.  Thrombocytopenia, improved.  Hemoglobin noted.  Some dilutional component and probable GI losses.  Recheck in a.m.  3.  Prediabetes.  On no concentrated sweet diet.  4.  Hypertension.  Numbers are better.  Continue blood pressure medicines.    Samantha Cazares MD  09/22/17  3:39 PM

## 2017-09-22 NOTE — PLAN OF CARE
Problem: Patient Care Overview (Adult)  Goal: Plan of Care Review  Outcome: Ongoing (interventions implemented as appropriate)    09/22/17 0542   Coping/Psychosocial Response Interventions   Plan Of Care Reviewed With patient   Patient Care Overview   Progress no change   Outcome Evaluation   Outcome Summary/Follow up Plan Patient anxious at times and became more anxious after SO left for the night. A&Ox4. VSS and on RA. Has concerns regarding discharge d/t him being evicted from his apartment. Cooperative throughout shift. H/A at times during the night and medicated per patients request. Will continue to monitor.

## 2017-09-22 NOTE — PLAN OF CARE
Problem: Patient Care Overview (Adult)  Goal: Plan of Care Review  Outcome: Ongoing (interventions implemented as appropriate)    09/22/17 0284   Outcome Evaluation   Outcome Summary/Follow up Plan Pt has slept on and off all day. VSS. CIWA score low thus far. Medicated for a HA as needed. Cooperative and pleasant. Banana bag hanging. Will continue to monitor.       Goal: Adult Individualization and Mutuality  Outcome: Ongoing (interventions implemented as appropriate)  Goal: Discharge Needs Assessment  Outcome: Ongoing (interventions implemented as appropriate)    Problem: Acute Alcohol Withdrawal Syndrome, Risk For/Actual (Adult)  Goal: Signs and Symptoms of Listed Potential Problems Will be Absent or Manageable (Acute Alcohol Withdrawal Syndrome, Risk For/Actual)  Outcome: Ongoing (interventions implemented as appropriate)    Problem: Skin Integrity Impairment, Risk/Actual (Adult)  Goal: Skin Integrity/Wound Healing  Outcome: Ongoing (interventions implemented as appropriate)    Problem: Fall Risk (Adult)  Goal: Absence of Falls  Outcome: Ongoing (interventions implemented as appropriate)

## 2017-09-23 VITALS
SYSTOLIC BLOOD PRESSURE: 133 MMHG | DIASTOLIC BLOOD PRESSURE: 74 MMHG | TEMPERATURE: 97.6 F | WEIGHT: 200 LBS | HEIGHT: 69 IN | RESPIRATION RATE: 18 BRPM | OXYGEN SATURATION: 100 % | HEART RATE: 57 BPM | BODY MASS INDEX: 29.62 KG/M2

## 2017-09-23 PROBLEM — F10.239 ALCOHOL DEPENDENCE WITH WITHDRAWAL (HCC): Status: RESOLVED | Noted: 2017-09-23 | Resolved: 2017-09-23

## 2017-09-23 PROBLEM — F10.239 ALCOHOL DEPENDENCE WITH WITHDRAWAL (HCC): Status: ACTIVE | Noted: 2017-09-23

## 2017-09-23 LAB
DEPRECATED RDW RBC AUTO: 50.2 FL (ref 37–54)
ERYTHROCYTE [DISTWIDTH] IN BLOOD BY AUTOMATED COUNT: 15 % (ref 11.5–14.5)
HCT VFR BLD AUTO: 40.4 % (ref 40.4–52.2)
HGB BLD-MCNC: 13.2 G/DL (ref 13.7–17.6)
MCH RBC QN AUTO: 30.6 PG (ref 27–32.7)
MCHC RBC AUTO-ENTMCNC: 32.7 G/DL (ref 32.6–36.4)
MCV RBC AUTO: 93.5 FL (ref 79.8–96.2)
PLATELET # BLD AUTO: 125 10*3/MM3 (ref 140–500)
PMV BLD AUTO: 9.8 FL (ref 6–12)
RBC # BLD AUTO: 4.32 10*6/MM3 (ref 4.6–6)
WBC NRBC COR # BLD: 7.56 10*3/MM3 (ref 4.5–10.7)

## 2017-09-23 PROCEDURE — 85027 COMPLETE CBC AUTOMATED: CPT | Performed by: INTERNAL MEDICINE

## 2017-09-23 RX ORDER — AMLODIPINE BESYLATE 5 MG/1
5 TABLET ORAL
Qty: 30 TABLET | Refills: 0 | Status: SHIPPED | OUTPATIENT
Start: 2017-09-24

## 2017-09-23 RX ORDER — ALUMINA, MAGNESIA, AND SIMETHICONE 2400; 2400; 240 MG/30ML; MG/30ML; MG/30ML
15 SUSPENSION ORAL EVERY 6 HOURS PRN
Start: 2017-09-23

## 2017-09-23 RX ORDER — ACETAMINOPHEN 325 MG/1
650 TABLET ORAL EVERY 6 HOURS PRN
Start: 2017-09-23

## 2017-09-23 RX ORDER — METOPROLOL SUCCINATE 50 MG/1
50 TABLET, EXTENDED RELEASE ORAL 2 TIMES DAILY
Qty: 30 TABLET | Refills: 0
Start: 2017-09-23

## 2017-09-23 RX ADMIN — METOPROLOL SUCCINATE 50 MG: 50 TABLET, FILM COATED, EXTENDED RELEASE ORAL at 08:38

## 2017-09-23 RX ADMIN — VENLAFAXINE HYDROCHLORIDE 150 MG: 150 CAPSULE, EXTENDED RELEASE ORAL at 08:38

## 2017-09-23 RX ADMIN — TRAMADOL HYDROCHLORIDE 50 MG: 50 TABLET, FILM COATED ORAL at 07:36

## 2017-09-23 RX ADMIN — AMLODIPINE BESYLATE 5 MG: 5 TABLET ORAL at 08:38

## 2017-09-23 RX ADMIN — FAMOTIDINE 20 MG: 20 TABLET, FILM COATED ORAL at 08:38

## 2017-09-23 NOTE — PLAN OF CARE
Problem: Patient Care Overview (Adult)  Goal: Plan of Care Review  Outcome: Ongoing (interventions implemented as appropriate)    09/23/17 0431   Coping/Psychosocial Response Interventions   Plan Of Care Reviewed With patient   Patient Care Overview   Progress improving   Outcome Evaluation   Outcome Summary/Follow up Plan Patient slept the majority of the night without c/o of pain. Cooperative and A&Ox4. Possible discharge home today. VSS and on RA. Will continue to monitor.

## 2017-09-23 NOTE — PROGRESS NOTES
[facility] gave pt list of several clinics that provide Vivitrol injections which is what he wants. He was also provided with a list of inpt and outpt programs to consider. Pt due for discharge today.

## 2017-09-23 NOTE — DISCHARGE SUMMARY
Date of Admission: 9/19/2017  Date of Discharge:  9/23/2017  Primary Care Physician: Omer Santos MD     Discharge Diagnosis:  Active Hospital Problems (** Indicates Principal Problem)    Diagnosis Date Noted   • **Alcohol withdrawal [F10.239] 08/06/2016   • Paroxysmal a-fib [I48.0] 09/19/2017   • Medical non-compliance [Z91.19] 09/19/2017   • Pancytopenia [D61.818] 09/11/2017   • Alcoholic cirrhosis [K70.30] 08/11/2016   • Alcohol abuse [F10.10] 08/06/2016   • Benign essential HTN [I10] 08/06/2016   • History of seizure [Z87.898] 08/06/2016      Resolved Hospital Problems    Diagnosis Date Noted Date Resolved   No resolved problems to display.       Presenting Problem/History of Present Illness:  Alcohol withdrawal, with unspecified complication [F10.239]  Alcohol intoxication, with unspecified complication [F10.129]     Hospital Course:  The patient is a 57 y.o. man who came to the emergency room because of alcoholism.  He had been discharged about 5 days prior to this admission.  He was admitted with alcohol intoxication and desired sobriety.  He had some very mild symptoms of withdrawal.  He has been tolerating a diet; he has been up and about..  He has required no Ativan in the last 24 hours.  [facility] saw the patient while he was here.  We will check with them regarding outpatient follow-up.  The patient has essential hypertension.  Metoprolol dosage was increased and Norvasc was started.  Blood pressures are improved.  He is ready for discharge home.    Stable condition; good prognosis if he stops drinking    Exam Today:  No issues overnight.  Sleeping but awakens easily.  Vital signs noted.  No distress.  Heart is regular without murmur.  Lungs are clear, breath sounds equal.  Abdomen is soft and nontender.  Extremities no edema.  No tremor of the outstretched hands    Procedures Performed:  CT head without contrast 9/19/17       Consults:   [facility]     Discharge Disposition:  Home or Self  Care    Discharge Medications:   Ton Edwards   Home Medication Instructions DEVANG:[id number]    Printed on:09/23/17 1019   Medication Information                      acetaminophen (TYLENOL) 325 MG tablet  Take 2 tablets by mouth Every 6 (Six) Hours As Needed for Mild Pain  or Headache.             aluminum-magnesium hydroxide-simethicone (MAALOX MAX) 400-400-40 MG/5ML suspension  Take 15 mL by mouth Every 6 (Six) Hours As Needed for Indigestion or Heartburn.             amLODIPine (NORVASC) 5 MG tablet  Take 1 tablet by mouth Daily.             metoprolol succinate XL (TOPROL XL) 50 MG 24 hr tablet  Take 1 tablet by mouth 2 (Two) Times a Day.             multivitamin (THERAGRAN) tablet tablet  Take 1 tablet by mouth Daily.             omeprazole (PriLOSEC) 40 MG capsule  Take 1 capsule by mouth daily.             venlafaxine XR (EFFEXOR-XR) 150 MG 24 hr capsule  Take 150 mg by mouth daily.                 Discharge Diet:   Diet Instructions     Diet: Regular; Thin       Discharge Diet:  Regular   Fluid Consistency:  Thin                 Activity at Discharge:   Activity Instructions     Activity as Tolerated                     Follow-up Appointments:  Dr. Omer Santos 1 week for hypertension   AA or other program as recommended by [facility] 1 day    Test Results Pending at Discharge:  None       Samantha Cazares MD  09/23/17  10:19 AM    Time Spent on Discharge Activities: 35 minutes.  Medical record reviewed.

## 2017-09-23 NOTE — NURSING NOTE
Pt was discharged earlier today and was waiting for ride home. While this nurse was at lunch he left with his ride but did not get discharge instructions. Called patient's girlfriend and she is to come back and get discharge papers and instructions.

## 2017-12-07 ENCOUNTER — HOSPITAL ENCOUNTER (EMERGENCY)
Facility: HOSPITAL | Age: 57
Discharge: HOME | End: 2017-12-07
Attending: EMERGENCY MEDICINE | Admitting: EMERGENCY MEDICINE

## 2017-12-07 ENCOUNTER — HOSPITAL ENCOUNTER (EMERGENCY)
Facility: HOSPITAL | Age: 57
End: 2017-12-07

## 2017-12-07 VITALS
BODY MASS INDEX: 33.32 KG/M2 | RESPIRATION RATE: 16 BRPM | HEART RATE: 106 BPM | OXYGEN SATURATION: 95 % | DIASTOLIC BLOOD PRESSURE: 68 MMHG | SYSTOLIC BLOOD PRESSURE: 127 MMHG | TEMPERATURE: 98.9 F | HEIGHT: 65 IN | WEIGHT: 200 LBS

## 2017-12-07 DIAGNOSIS — IMO0002 ALCOHOL USE DISORDER: ICD-10-CM

## 2017-12-07 DIAGNOSIS — F10.920 ACUTE ALCOHOLIC INTOXICATION WITHOUT COMPLICATION (HCC): Primary | ICD-10-CM

## 2017-12-07 LAB
ALBUMIN SERPL-MCNC: 4 G/DL (ref 3.5–5.2)
ALBUMIN/GLOB SERPL: 1.1 G/DL
ALP SERPL-CCNC: 73 U/L (ref 39–117)
ALT SERPL W P-5'-P-CCNC: 28 U/L (ref 1–41)
AMPHET+METHAMPHET UR QL: NEGATIVE
ANION GAP SERPL CALCULATED.3IONS-SCNC: 16.2 MMOL/L
AST SERPL-CCNC: 42 U/L (ref 1–40)
BARBITURATES UR QL SCN: NEGATIVE
BASOPHILS # BLD AUTO: 0.02 10*3/MM3 (ref 0–0.2)
BASOPHILS NFR BLD AUTO: 0.4 % (ref 0–1.5)
BENZODIAZ UR QL SCN: NEGATIVE
BILIRUB SERPL-MCNC: 0.5 MG/DL (ref 0.1–1.2)
BUN BLD-MCNC: 6 MG/DL (ref 6–20)
BUN/CREAT SERPL: 8.1 (ref 7–25)
CALCIUM SPEC-SCNC: 8.3 MG/DL (ref 8.6–10.5)
CANNABINOIDS SERPL QL: NEGATIVE
CHLORIDE SERPL-SCNC: 101 MMOL/L (ref 98–107)
CO2 SERPL-SCNC: 25.8 MMOL/L (ref 22–29)
COCAINE UR QL: NEGATIVE
CREAT BLD-MCNC: 0.74 MG/DL (ref 0.76–1.27)
DEPRECATED RDW RBC AUTO: 53.3 FL (ref 37–54)
EOSINOPHIL # BLD AUTO: 0.06 10*3/MM3 (ref 0–0.7)
EOSINOPHIL NFR BLD AUTO: 1.1 % (ref 0.3–6.2)
ERYTHROCYTE [DISTWIDTH] IN BLOOD BY AUTOMATED COUNT: 16.2 % (ref 11.5–14.5)
ETHANOL BLD-MCNC: 291 MG/DL (ref 0–10)
ETHANOL UR QL: 0.29 %
GFR SERPL CREATININE-BSD FRML MDRD: 109 ML/MIN/1.73
GLOBULIN UR ELPH-MCNC: 3.7 GM/DL
GLUCOSE BLD-MCNC: 149 MG/DL (ref 65–99)
HCT VFR BLD AUTO: 44 % (ref 40.4–52.2)
HGB BLD-MCNC: 14.8 G/DL (ref 13.7–17.6)
IMM GRANULOCYTES # BLD: 0 10*3/MM3 (ref 0–0.03)
IMM GRANULOCYTES NFR BLD: 0 % (ref 0–0.5)
LYMPHOCYTES # BLD AUTO: 2.77 10*3/MM3 (ref 0.9–4.8)
LYMPHOCYTES NFR BLD AUTO: 49.8 % (ref 19.6–45.3)
MAGNESIUM SERPL-MCNC: 2 MG/DL (ref 1.6–2.6)
MCH RBC QN AUTO: 30.3 PG (ref 27–32.7)
MCHC RBC AUTO-ENTMCNC: 33.6 G/DL (ref 32.6–36.4)
MCV RBC AUTO: 90 FL (ref 79.8–96.2)
METHADONE UR QL SCN: NEGATIVE
MONOCYTES # BLD AUTO: 0.73 10*3/MM3 (ref 0.2–1.2)
MONOCYTES NFR BLD AUTO: 13.1 % (ref 5–12)
NEUTROPHILS # BLD AUTO: 1.98 10*3/MM3 (ref 1.9–8.1)
NEUTROPHILS NFR BLD AUTO: 35.6 % (ref 42.7–76)
OPIATES UR QL: NEGATIVE
OXYCODONE UR QL SCN: NEGATIVE
PLATELET # BLD AUTO: 117 10*3/MM3 (ref 140–500)
PMV BLD AUTO: 10 FL (ref 6–12)
POTASSIUM BLD-SCNC: 3.7 MMOL/L (ref 3.5–5.2)
PROT SERPL-MCNC: 7.7 G/DL (ref 6–8.5)
RBC # BLD AUTO: 4.89 10*6/MM3 (ref 4.6–6)
SODIUM BLD-SCNC: 143 MMOL/L (ref 136–145)
WBC NRBC COR # BLD: 5.56 10*3/MM3 (ref 4.5–10.7)

## 2017-12-07 PROCEDURE — 96375 TX/PRO/DX INJ NEW DRUG ADDON: CPT

## 2017-12-07 PROCEDURE — 80307 DRUG TEST PRSMV CHEM ANLYZR: CPT | Performed by: PHYSICIAN ASSISTANT

## 2017-12-07 PROCEDURE — 25010000002 THIAMINE PER 100 MG: Performed by: PHYSICIAN ASSISTANT

## 2017-12-07 PROCEDURE — 25010000002 ONDANSETRON PER 1 MG: Performed by: PHYSICIAN ASSISTANT

## 2017-12-07 PROCEDURE — 25010000002 MAGNESIUM SULFATE PER 500 MG OF MAGNESIUM: Performed by: PHYSICIAN ASSISTANT

## 2017-12-07 PROCEDURE — 80053 COMPREHEN METABOLIC PANEL: CPT | Performed by: PHYSICIAN ASSISTANT

## 2017-12-07 PROCEDURE — 83735 ASSAY OF MAGNESIUM: CPT | Performed by: PHYSICIAN ASSISTANT

## 2017-12-07 PROCEDURE — 99284 EMERGENCY DEPT VISIT MOD MDM: CPT

## 2017-12-07 PROCEDURE — 96365 THER/PROPH/DIAG IV INF INIT: CPT

## 2017-12-07 PROCEDURE — 85025 COMPLETE CBC W/AUTO DIFF WBC: CPT | Performed by: PHYSICIAN ASSISTANT

## 2017-12-07 RX ORDER — SODIUM CHLORIDE 0.9 % (FLUSH) 0.9 %
10 SYRINGE (ML) INJECTION AS NEEDED
Status: DISCONTINUED | OUTPATIENT
Start: 2017-12-07 | End: 2017-12-07 | Stop reason: HOSPADM

## 2017-12-07 RX ORDER — PANTOPRAZOLE SODIUM 40 MG/1
40 TABLET, DELAYED RELEASE ORAL DAILY
COMMUNITY

## 2017-12-07 RX ORDER — CLONIDINE HYDROCHLORIDE 0.1 MG/1
0.1 TABLET ORAL 2 TIMES DAILY
COMMUNITY

## 2017-12-07 RX ORDER — THIAMINE MONONITRATE (VIT B1) 100 MG
100 TABLET ORAL DAILY
COMMUNITY

## 2017-12-07 RX ORDER — ONDANSETRON 2 MG/ML
4 INJECTION INTRAMUSCULAR; INTRAVENOUS ONCE
Status: COMPLETED | OUTPATIENT
Start: 2017-12-07 | End: 2017-12-07

## 2017-12-07 RX ORDER — FAMOTIDINE 10 MG/ML
20 INJECTION, SOLUTION INTRAVENOUS ONCE
Status: COMPLETED | OUTPATIENT
Start: 2017-12-07 | End: 2017-12-07

## 2017-12-07 RX ORDER — FOLIC ACID 1 MG/1
1 TABLET ORAL DAILY
COMMUNITY

## 2017-12-07 RX ORDER — FLUOXETINE HYDROCHLORIDE 20 MG/1
40 CAPSULE ORAL DAILY
COMMUNITY

## 2017-12-07 RX ORDER — NALTREXONE HYDROCHLORIDE 50 MG/1
50 TABLET, FILM COATED ORAL DAILY
COMMUNITY

## 2017-12-07 RX ORDER — HYDROXYZINE HYDROCHLORIDE 25 MG/1
75 TABLET, FILM COATED ORAL DAILY
COMMUNITY

## 2017-12-07 RX ADMIN — FAMOTIDINE 20 MG: 10 INJECTION, SOLUTION INTRAVENOUS at 01:25

## 2017-12-07 RX ADMIN — ONDANSETRON 4 MG: 2 INJECTION INTRAMUSCULAR; INTRAVENOUS at 01:25

## 2017-12-07 RX ADMIN — FOLIC ACID 1000 ML/HR: 5 INJECTION, SOLUTION INTRAMUSCULAR; INTRAVENOUS; SUBCUTANEOUS at 01:20

## 2017-12-07 NOTE — ED PROVIDER NOTES
EMERGENCY DEPARTMENT ENCOUNTER    CHIEF COMPLAINT  Chief Complaint: alcohol intoxication  History given by: patient  History limited by: nothing   Room Number: 11/11  PMD: MAKSIM Sanders      HPI:  Pt is a 57 y.o. male with a h/o alcohol abuse who presents due to alcohol intoxication. His last drink was 1 hour PTA. He estimates that he has armand drinking 10-12 tall cans of beer each day; pt used to drink liquor but not recently. Pt had been sober for 60 days but began drinking again roughly 4 days ago. He estimates he has been an alcoholic for 15 years, and the longest he has gone without drinking during that time is 13 months. Pt now complains of burning abdominal pain. Pt also has a h/o cirrhosis and hypertension.     Duration:  Roughly 4 days  Timing: constant  Location: generalized   Radiation: none  Quality: beer   Intensity/Severity: moderate   Progression: unchanged   Associated Symptoms: abdominal pain  Aggravating Factors: none specified  Alleviating Factors: none specified   Previous Episodes: Pt has a long h/o alcohol abuse   Treatment before arrival: none specified     PAST MEDICAL HISTORY  Active Ambulatory Problems     Diagnosis Date Noted   • Alcohol withdrawal 08/06/2016   • Benign essential HTN 08/06/2016   • Alcohol abuse 08/06/2016   • History of seizure 08/06/2016   • Alcoholic cirrhosis 08/11/2016   • Lymphocytosis 09/10/2017   • Thrombocytopenia 09/11/2017   • Pancytopenia 09/11/2017   • Paroxysmal a-fib 09/19/2017   • Medical non-compliance 09/19/2017     Resolved Ambulatory Problems     Diagnosis Date Noted   • Atrial fibrillation with RVR 09/10/2017   • DTs (delirium tremens) 09/10/2017   • Abdominal pain 09/11/2017   • Alcohol dependence with withdrawal 09/23/2017     Past Medical History:   Diagnosis Date   • Alcohol abuse    • Alcohol withdrawal seizure    • Alcoholic cirrhosis 8/11/2016   • Alcoholism    • Depression    • Hypertension        PAST SURGICAL HISTORY  History  reviewed. No pertinent surgical history.    FAMILY HISTORY  Family History   Problem Relation Age of Onset   • Cancer Mother    • Depression Maternal Aunt        SOCIAL HISTORY  Social History     Social History   • Marital status:      Spouse name: N/A   • Number of children: N/A   • Years of education: N/A     Occupational History   • Not on file.     Social History Main Topics   • Smoking status: Never Smoker   • Smokeless tobacco: Not on file   • Alcohol use Yes      Comment: approx 30 + beers in last 24 hours 0.5 to 1 gallon of vodka daily last 6 months prior 1 gallon/wk  daily 7 months   • Drug use: Yes   • Sexual activity: No     Other Topics Concern   • Not on file     Social History Narrative       ALLERGIES  Review of patient's allergies indicates no known allergies.    REVIEW OF SYSTEMS  Review of Systems   Constitutional: Negative for fever.   Respiratory: Negative for shortness of breath.    Cardiovascular: Negative for chest pain.   Gastrointestinal: Positive for abdominal pain (burning).   Psychiatric/Behavioral:        Alcohol abuse        PHYSICAL EXAM  ED Triage Vitals   Temp Heart Rate Resp BP SpO2   12/07/17 0020 12/07/17 0020 -- -- 12/07/17 0020   98.9 °F (37.2 °C) 79   94 %      Temp src Heart Rate Source Patient Position BP Location FiO2 (%)   12/07/17 0020 12/07/17 0020 -- -- --   Tympanic Monitor          Physical Exam   Constitutional: He is oriented to person, place, and time.   Smelled of alcohol. No signs of withdrawal.    HENT:   Head: Normocephalic and atraumatic.   Eyes: EOM are normal. Pupils are equal, round, and reactive to light.   Neck: Normal range of motion. Neck supple.   Cardiovascular: Normal rate, regular rhythm and normal heart sounds.    Not tachycardic   Pulmonary/Chest: Effort normal and breath sounds normal. No respiratory distress.   Abdominal: Soft. There is tenderness. There is no rebound and no guarding.   Musculoskeletal: Normal range of motion. He  exhibits no edema.   Neurological: He is alert and oriented to person, place, and time. He has normal sensation and normal strength. He displays no tremor.   No tremors.   Skin: Skin is warm and dry.   Nursing note and vitals reviewed.      LAB RESULTS  Lab Results (last 24 hours)     Procedure Component Value Units Date/Time    CBC & Differential [[id number]] Collected:  12/07/17 0118    Specimen:  Blood Updated:  12/07/17 0131    Narrative:       The following orders were created for panel order CBC & Differential.  Procedure                               Abnormality         Status                     ---------                               -----------         ------                     CBC Auto Differential[[id number]]        Abnormal            Final result                 Please view results for these tests on the individual orders.    Magnesium [[id number]]  (Normal) Collected:  12/07/17 0118    Specimen:  Blood Updated:  12/07/17 0148     Magnesium 2.0 mg/dL     CBC Auto Differential [[id number]]  (Abnormal) Collected:  12/07/17 0118    Specimen:  Blood Updated:  12/07/17 0131     WBC 5.56 10*3/mm3      RBC 4.89 10*6/mm3      Hemoglobin 14.8 g/dL      Hematocrit 44.0 %      MCV 90.0 fL      MCH 30.3 pg      MCHC 33.6 g/dL      RDW 16.2 (H) %      RDW-SD 53.3 fl      MPV 10.0 fL      Platelets 117 (L) 10*3/mm3      Neutrophil % 35.6 (L) %      Lymphocyte % 49.8 (H) %      Monocyte % 13.1 (H) %      Eosinophil % 1.1 %      Basophil % 0.4 %      Immature Grans % 0.0 %      Neutrophils, Absolute 1.98 10*3/mm3      Lymphocytes, Absolute 2.77 10*3/mm3      Monocytes, Absolute 0.73 10*3/mm3      Eosinophils, Absolute 0.06 10*3/mm3      Basophils, Absolute 0.02 10*3/mm3      Immature Grans, Absolute 0.00 10*3/mm3     Urine Drug Screen - Urine, Clean Catch [[id number]]  (Normal) Collected:  12/07/17 0146    Specimen:  Urine from Urine, Clean Catch Updated:  12/07/17 0218     Amphet/Methamphet, Screen Negative      Barbiturates Screen, Urine Negative     Benzodiazepine Screen, Urine Negative     Cocaine Screen, Urine Negative     Opiate Screen Negative     THC, Screen, Urine Negative     Methadone Screen, Urine Negative     Oxycodone Screen, Urine Negative    Narrative:       Negative Thresholds For Drugs Screened:     Amphetamines               500 ng/ml   Barbiturates               200 ng/ml   Benzodiazepines            100 ng/ml   Cocaine                    300 ng/ml   Methadone                  300 ng/ml   Opiates                    300 ng/ml   Oxycodone                  100 ng/ml   THC                        50 ng/ml    The Normal Value for all drugs tested is negative. This report includes final unconfirmed screening results to be used for medical treatment purposes only. Unconfirmed results must not be used for non-medical purposes such as employment or legal testing. Clinical consideration should be applied to any drug of abuse test, particulary when unconfirmed results are used.    Comprehensive Metabolic Panel [[id number]]  (Abnormal) Collected:  12/07/17 0217    Specimen:  Blood Updated:  12/07/17 0319     Glucose 149 (H) mg/dL      BUN 6 mg/dL      Creatinine 0.74 (L) mg/dL      Sodium 143 mmol/L      Potassium 3.7 mmol/L      Chloride 101 mmol/L      CO2 25.8 mmol/L      Calcium 8.3 (L) mg/dL      Total Protein 7.7 g/dL      Albumin 4.00 g/dL      ALT (SGPT) 28 U/L      AST (SGOT) 42 (H) U/L      Alkaline Phosphatase 73 U/L      Total Bilirubin 0.5 mg/dL      eGFR Non African Amer 109 mL/min/1.73      Globulin 3.7 gm/dL      A/G Ratio 1.1 g/dL      BUN/Creatinine Ratio 8.1     Anion Gap 16.2 mmol/L     Ethanol [[id number]]  (Abnormal) Collected:  12/07/17 0217    Specimen:  Blood Updated:  12/07/17 0322     Ethanol 291 (C) mg/dL      Ethanol % 0.291 %           I ordered the above labs and reviewed the results      PROCEDURES  Procedures      PROGRESS AND CONSULTS  ED Course     0055: Ordered labs, banana bag,  Pepcid, and Zofran.     0341: Rechecked pt. Pt is resting comfortably. Discussed EtOH level and explained that we do not do inpatient detox. Pt is familiar with programs available to him as he has been to rehab before. He understands and agrees with plan for discharge, and all questions were addressed.     MEDICAL DECISION MAKING  Results were reviewed/discussed with the patient and they were also made aware of online access. Pt also made aware that some labs, such as cultures, will not be resulted during ER visit and follow up with PMD is necessary.     MDM  Number of Diagnoses or Management Options     Amount and/or Complexity of Data Reviewed  Clinical lab tests: ordered and reviewed    Patient Progress  Patient progress: stable         DIAGNOSIS  Final diagnoses:   Acute alcoholic intoxication without complication   Alcohol use disorder       DISPOSITION  DISCHARGE    Patient discharged in stable condition.    Reviewed implications of results, diagnosis, meds, responsibility to follow up, warning signs and symptoms of possible worsening, potential complications and reasons to return to ER.    Patient/Family voiced understanding of above instructions.    Discussed plan for discharge, as there is no emergent indication for admission.  Pt/family is agreeable and understands need for follow up and repeat testing.  Pt is aware that discharge does not mean that nothing is wrong but it indicates no emergency is present that requires admission and they must continue care with follow-up as given below or physician of their choice.     FOLLOW-UP  MAKSIM Sanders  [address] [place], [place] [id number]  [phone]    Schedule an appointment as soon as possible for a visit           Medication List      Stop          aluminum-magnesium hydroxide-simethicone 400-400-40 MG/5ML suspension   Commonly known as:  MAALOX MAX       metoprolol succinate XL 50 MG 24 hr tablet   Commonly known as:  TOPROL XL        multivitamin tablet tablet       omeprazole 40 MG capsule   Commonly known as:  priLOSEC           Latest Documented Vital Signs:  As of 5:40 AM  BP- 148/81 HR- 93 Temp- 98.9 °F (37.2 °C) (Tympanic) O2 sat- 93%    --  Documentation assistance provided by andrew Hameed for Alpesh Schuster.  Information recorded by the tawanaibcal was done at my direction and has been verified and validated by me.              Elaine Hameed  12/07/17 0450       MAKSIM Thornton  12/07/17 0599

## 2017-12-07 NOTE — ED PROVIDER NOTES
Pt presents to the ED with EtOH abuse. Pt states that he was an alcoholic for 15 years, but was sober for 60 days until 4 days ago. Pt states that he has had 10-12 beers tonight. On exam, Pt is resting comfortably, in no distress, and without focal neurologic deficit. Intoxicated but there is no sign of active withdrawal. Pt is non tremulous and heart rate is normal. I agree with the plan for labs.     Attestation:  I supervised care provided by the midlevel provider.    We have discussed this patient's history, physical exam, and treatment plan.   I have reviewed the note and personally saw and examined the patient and agree with the plan of care.    Documentation assistance provided by andrew Delgado for Dr Newby. Information recorded by the scribe was done at my direction and has been verified and validated by me.     Leticia Delgado  12/07/17 0241       Pablo Newby MD  12/07/17 0611

## 2017-12-07 NOTE — ED NOTES
Spoke with pt girlfriend, states she is on her way to pick pt up. Informed her he will be waiting for her in the waiting room/lobby.      Jasmyn Andersen RN  12/07/17 5517

## 2017-12-07 NOTE — ED TRIAGE NOTES
ETOH intoxication.  Brought in by girlfriend.  States pt was recently in treatment for 90 day.  Out of treatment x 1 month.  Pt's girlfriend states she noticed he started drinking again approx 10 days ago. Pt is on Vivitrol injections monthly.  Last injection Dec 3rd.

## 2017-12-07 NOTE — ED NOTES
Attempted to call pt's girlfriend x 3, goes to full voicemail inbox. Cannot reach pt girlfriend. Pt states he does not have anyone else to take him home. Will continue to try to call pt girlfriend.      Jasmyn Andersen RN  12/07/17 9131

## 2017-12-11 ENCOUNTER — APPOINTMENT (OUTPATIENT)
Dept: CT IMAGING | Facility: HOSPITAL | Age: 57
End: 2017-12-11

## 2017-12-11 ENCOUNTER — HOSPITAL ENCOUNTER (EMERGENCY)
Facility: HOSPITAL | Age: 57
Discharge: HOME | End: 2017-12-11
Attending: EMERGENCY MEDICINE | Admitting: EMERGENCY MEDICINE

## 2017-12-11 VITALS
TEMPERATURE: 98.3 F | HEART RATE: 82 BPM | OXYGEN SATURATION: 92 % | SYSTOLIC BLOOD PRESSURE: 149 MMHG | DIASTOLIC BLOOD PRESSURE: 84 MMHG | WEIGHT: 200 LBS | HEIGHT: 65 IN | RESPIRATION RATE: 18 BRPM | BODY MASS INDEX: 33.32 KG/M2

## 2017-12-11 DIAGNOSIS — F10.920 ALCOHOLIC INTOXICATION WITHOUT COMPLICATION (HCC): ICD-10-CM

## 2017-12-11 DIAGNOSIS — F10.20 ALCOHOLISM (HCC): Primary | ICD-10-CM

## 2017-12-11 LAB
ALBUMIN SERPL-MCNC: 4 G/DL (ref 3.5–5.2)
ALBUMIN/GLOB SERPL: 1.1 G/DL
ALP SERPL-CCNC: 83 U/L (ref 39–117)
ALT SERPL W P-5'-P-CCNC: 31 U/L (ref 1–41)
AMPHET+METHAMPHET UR QL: NEGATIVE
ANION GAP SERPL CALCULATED.3IONS-SCNC: 16 MMOL/L
APTT PPP: 29 SECONDS (ref 22.7–35.4)
AST SERPL-CCNC: 55 U/L (ref 1–40)
BARBITURATES UR QL SCN: NEGATIVE
BENZODIAZ UR QL SCN: NEGATIVE
BILIRUB SERPL-MCNC: 0.8 MG/DL (ref 0.1–1.2)
BILIRUB UR QL STRIP: NEGATIVE
BUN BLD-MCNC: 8 MG/DL (ref 6–20)
BUN/CREAT SERPL: 9.4 (ref 7–25)
CALCIUM SPEC-SCNC: 8.2 MG/DL (ref 8.6–10.5)
CANNABINOIDS SERPL QL: NEGATIVE
CHLORIDE SERPL-SCNC: 101 MMOL/L (ref 98–107)
CLARITY UR: CLEAR
CO2 SERPL-SCNC: 25 MMOL/L (ref 22–29)
COCAINE UR QL: NEGATIVE
COLOR UR: YELLOW
CREAT BLD-MCNC: 0.85 MG/DL (ref 0.76–1.27)
DEPRECATED RDW RBC AUTO: 49.9 FL (ref 37–54)
EOSINOPHIL # BLD MANUAL: 0.04 10*3/MM3 (ref 0–0.7)
EOSINOPHIL NFR BLD MANUAL: 1 % (ref 0.3–6.2)
ERYTHROCYTE [DISTWIDTH] IN BLOOD BY AUTOMATED COUNT: 15.8 % (ref 11.5–14.5)
ETHANOL BLD-MCNC: 239 MG/DL (ref 0–10)
ETHANOL UR QL: 0.24 %
GFR SERPL CREATININE-BSD FRML MDRD: 93 ML/MIN/1.73
GLOBULIN UR ELPH-MCNC: 3.8 GM/DL
GLUCOSE BLD-MCNC: 142 MG/DL (ref 65–99)
GLUCOSE UR STRIP-MCNC: NEGATIVE MG/DL
HCT VFR BLD AUTO: 38.9 % (ref 40.4–52.2)
HGB BLD-MCNC: 13.4 G/DL (ref 13.7–17.6)
HGB UR QL STRIP.AUTO: NEGATIVE
INR PPP: 1.4 (ref 0.9–1.1)
KETONES UR QL STRIP: NEGATIVE
LEUKOCYTE ESTERASE UR QL STRIP.AUTO: NEGATIVE
LIPASE SERPL-CCNC: 40 U/L (ref 13–60)
LYMPHOCYTES # BLD MANUAL: 2.45 10*3/MM3 (ref 0.9–4.8)
LYMPHOCYTES NFR BLD MANUAL: 55 % (ref 19.6–45.3)
LYMPHOCYTES NFR BLD MANUAL: 7 % (ref 5–12)
MAGNESIUM SERPL-MCNC: 1.8 MG/DL (ref 1.6–2.6)
MCH RBC QN AUTO: 29.6 PG (ref 27–32.7)
MCHC RBC AUTO-ENTMCNC: 34.4 G/DL (ref 32.6–36.4)
MCV RBC AUTO: 85.9 FL (ref 79.8–96.2)
METHADONE UR QL SCN: NEGATIVE
MONOCYTES # BLD AUTO: 0.31 10*3/MM3 (ref 0.2–1.2)
NEUTROPHILS # BLD AUTO: 1.65 10*3/MM3 (ref 1.9–8.1)
NEUTROPHILS NFR BLD MANUAL: 37 % (ref 42.7–76)
NITRITE UR QL STRIP: NEGATIVE
OPIATES UR QL: NEGATIVE
OXYCODONE UR QL SCN: NEGATIVE
PH UR STRIP.AUTO: 6.5 [PH] (ref 5–8)
PLAT MORPH BLD: NORMAL
PLATELET # BLD AUTO: 85 10*3/MM3 (ref 140–500)
PMV BLD AUTO: 9.4 FL (ref 6–12)
POTASSIUM BLD-SCNC: 3.6 MMOL/L (ref 3.5–5.2)
PROT SERPL-MCNC: 7.8 G/DL (ref 6–8.5)
PROT UR QL STRIP: NEGATIVE
PROTHROMBIN TIME: 16.6 SECONDS (ref 11.7–14.2)
RBC # BLD AUTO: 4.53 10*6/MM3 (ref 4.6–6)
RBC MORPH BLD: NORMAL
SCAN SLIDE: NORMAL
SODIUM BLD-SCNC: 142 MMOL/L (ref 136–145)
SP GR UR STRIP: 1.01 (ref 1–1.03)
UROBILINOGEN UR QL STRIP: NORMAL
WBC MORPH BLD: NORMAL
WBC NRBC COR # BLD: 4.45 10*3/MM3 (ref 4.5–10.7)

## 2017-12-11 PROCEDURE — 80053 COMPREHEN METABOLIC PANEL: CPT | Performed by: NURSE PRACTITIONER

## 2017-12-11 PROCEDURE — 85025 COMPLETE CBC W/AUTO DIFF WBC: CPT | Performed by: NURSE PRACTITIONER

## 2017-12-11 PROCEDURE — 96365 THER/PROPH/DIAG IV INF INIT: CPT

## 2017-12-11 PROCEDURE — 81003 URINALYSIS AUTO W/O SCOPE: CPT | Performed by: EMERGENCY MEDICINE

## 2017-12-11 PROCEDURE — 80307 DRUG TEST PRSMV CHEM ANLYZR: CPT | Performed by: EMERGENCY MEDICINE

## 2017-12-11 PROCEDURE — 70450 CT HEAD/BRAIN W/O DYE: CPT

## 2017-12-11 PROCEDURE — 85007 BL SMEAR W/DIFF WBC COUNT: CPT | Performed by: NURSE PRACTITIONER

## 2017-12-11 PROCEDURE — 25010000002 MAGNESIUM SULFATE PER 500 MG OF MAGNESIUM: Performed by: NURSE PRACTITIONER

## 2017-12-11 PROCEDURE — 85730 THROMBOPLASTIN TIME PARTIAL: CPT | Performed by: EMERGENCY MEDICINE

## 2017-12-11 PROCEDURE — 83690 ASSAY OF LIPASE: CPT | Performed by: EMERGENCY MEDICINE

## 2017-12-11 PROCEDURE — 99284 EMERGENCY DEPT VISIT MOD MDM: CPT

## 2017-12-11 PROCEDURE — 85610 PROTHROMBIN TIME: CPT | Performed by: EMERGENCY MEDICINE

## 2017-12-11 PROCEDURE — 90791 PSYCH DIAGNOSTIC EVALUATION: CPT | Performed by: SOCIAL WORKER

## 2017-12-11 PROCEDURE — 25010000002 THIAMINE PER 100 MG: Performed by: NURSE PRACTITIONER

## 2017-12-11 PROCEDURE — 83735 ASSAY OF MAGNESIUM: CPT | Performed by: NURSE PRACTITIONER

## 2017-12-11 RX ORDER — SODIUM CHLORIDE 0.9 % (FLUSH) 0.9 %
10 SYRINGE (ML) INJECTION AS NEEDED
Status: DISCONTINUED | OUTPATIENT
Start: 2017-12-11 | End: 2017-12-11 | Stop reason: HOSPADM

## 2017-12-11 RX ADMIN — MAGNESIUM SULFATE HEPTAHYDRATE 1000 ML/HR: 500 INJECTION, SOLUTION INTRAMUSCULAR; INTRAVENOUS at 09:46

## 2017-12-11 NOTE — ED NOTES
"Pt stating multiple times despite attempted redirection \"I need some water\". Pt also c/o nausea and \"I think I'm going to vomit.\" Pt attempting to stand on his own and he is very unsteady. Instructed pt to sit back in wheelchair.      Sandra Hernadez RN  12/11/17 0758    "

## 2017-12-11 NOTE — ED NOTES
"Pt was at home and girlfriend called for ETOH intoxication. Pt seen here last week for similar symptoms. Pt's girlfriend reports he drinks approx 15 \"large beers\" a day and last drank last night about midnight. Pt has hx of cirrhosis and pancreatitis.      Sandra Hernadez RN  12/11/17 0746    "

## 2017-12-11 NOTE — ED PROVIDER NOTES
"EMERGENCY DEPARTMENT ENCOUNTER    CHIEF COMPLAINT  Chief Complaint: Alcohol Intoxication  History given by: pt  History limited by: Intoxicated  Room Number: HALC/C  PMD: MAKSIM Sanders      HPI:  Pt is a 57 y.o. male who presents via EMS complaining of alcohol intoxication and looking for help regarding his alcohol abuse. Pt states he stopped drinking last night, but he did consume a large amount of alcohol yesterday. He daily consumes a large amount of alcohol.  Pt also c/o N/V [last episode was reported to be yesterday] and abdominal pain. Pt states he fell yesterday as well, but denies HA. Pt denies any other complaints at this time. Pt has a hx of chronic alcohol abuse.    Duration:  Chronic, but began drinking yesterday   Onset: gradual  Timing: constant   Location: N/A  Radiation: N/A  Quality: \"intoxicated\"  Intensity/Severity: moderate   Progression: unchanged   Associated Symptoms: N/V, abdominal pain  Aggravating Factors: None reported   Alleviating Factors: None reported   Previous Episodes: PT has a hx of alcohol abuse.  Treatment before arrival: Pt was drinking last night.     PAST MEDICAL HISTORY  Active Ambulatory Problems     Diagnosis Date Noted   • Alcohol withdrawal 08/06/2016   • Benign essential HTN 08/06/2016   • Alcohol abuse 08/06/2016   • History of seizure 08/06/2016   • Alcoholic cirrhosis 08/11/2016   • Lymphocytosis 09/10/2017   • Thrombocytopenia 09/11/2017   • Pancytopenia 09/11/2017   • Paroxysmal a-fib 09/19/2017   • Medical non-compliance 09/19/2017     Resolved Ambulatory Problems     Diagnosis Date Noted   • Atrial fibrillation with RVR 09/10/2017   • DTs (delirium tremens) 09/10/2017   • Abdominal pain 09/11/2017   • Alcohol dependence with withdrawal 09/23/2017     Past Medical History:   Diagnosis Date   • Alcohol abuse    • Alcohol withdrawal seizure    • Alcoholic cirrhosis 8/11/2016   • Alcoholism    • Depression    • Hypertension        PAST SURGICAL " HISTORY  History reviewed. No pertinent surgical history.    FAMILY HISTORY  Family History   Problem Relation Age of Onset   • Cancer Mother    • Depression Maternal Aunt        SOCIAL HISTORY  Social History     Social History   • Marital status:      Spouse name: N/A   • Number of children: N/A   • Years of education: N/A     Occupational History   • Not on file.     Social History Main Topics   • Smoking status: Never Smoker   • Smokeless tobacco: Not on file   • Alcohol use Yes      Comment: approx 30 + beers in last 24 hours 0.5 to 1 gallon of vodka daily last 6 months prior 1 gallon/wk  daily 7 months   • Drug use: Yes   • Sexual activity: No     Other Topics Concern   • Not on file     Social History Narrative       ALLERGIES  Review of patient's allergies indicates no known allergies.    REVIEW OF SYSTEMS  Review of Systems   Unable to perform ROS: Other (Intoxicated )   Gastrointestinal: Positive for abdominal pain, nausea and vomiting.   Neurological: Negative for headaches.       PHYSICAL EXAM  ED Triage Vitals   Temp Heart Rate Resp BP SpO2   12/11/17 0802 12/11/17 0746 12/11/17 0746 12/11/17 0746 12/11/17 0746   98.3 °F (36.8 °C) 88 16 136/90 96 %      Temp src Heart Rate Source Patient Position BP Location FiO2 (%)   12/11/17 0802 -- -- -- --   Oral           Physical Exam   Constitutional: He is oriented to person, place, and time and well-developed, well-nourished, and in no distress.   Smells of alcohol    HENT:   Head: Normocephalic and atraumatic.   Eyes: EOM are normal. Pupils are equal, round, and reactive to light.   Neck: Normal range of motion. Neck supple.   Cardiovascular: Normal rate, regular rhythm, normal heart sounds and intact distal pulses.    Pulmonary/Chest: Effort normal and breath sounds normal. No respiratory distress.   Abdominal: Soft. Bowel sounds are normal. He exhibits no distension. There is no tenderness. There is no rebound and no guarding.   Musculoskeletal:  Normal range of motion. He exhibits no edema.   Neurological: He is alert and oriented to person, place, and time. He has normal sensation and normal strength.   No motor or sensory changes, no focal weakness   Skin: Skin is warm and dry.   Psychiatric: Mood and affect normal.   Nursing note and vitals reviewed.      LAB RESULTS  Lab Results (last 24 hours)     Procedure Component Value Units Date/Time    CBC & Differential [[id number]] Collected:  12/11/17 0919    Specimen:  Blood Updated:  12/11/17 0958    Narrative:       The following orders were created for panel order CBC & Differential.  Procedure                               Abnormality         Status                     ---------                               -----------         ------                     Manual Differential[[id number]]          Abnormal            Final result               Scan Slide[[id number]]                                       Final result               CBC Auto Differential[[id number]]        Abnormal            Final result                 Please view results for these tests on the individual orders.    Comprehensive Metabolic Panel [[id number]]  (Abnormal) Collected:  12/11/17 0919    Specimen:  Blood Updated:  12/11/17 0948     Glucose 142 (H) mg/dL      BUN 8 mg/dL      Creatinine 0.85 mg/dL      Sodium 142 mmol/L      Potassium 3.6 mmol/L      Chloride 101 mmol/L      CO2 25.0 mmol/L      Calcium 8.2 (L) mg/dL      Total Protein 7.8 g/dL      Albumin 4.00 g/dL      ALT (SGPT) 31 U/L      AST (SGOT) 55 (H) U/L      Alkaline Phosphatase 83 U/L      Total Bilirubin 0.8 mg/dL      eGFR Non African Amer 93 mL/min/1.73      Globulin 3.8 gm/dL      A/G Ratio 1.1 g/dL      BUN/Creatinine Ratio 9.4     Anion Gap 16.0 mmol/L     Magnesium [[id number]]  (Normal) Collected:  12/11/17 0919    Specimen:  Blood Updated:  12/11/17 0948     Magnesium 1.8 mg/dL     CBC Auto Differential [[id number]]  (Abnormal) Collected:  12/11/17 0919    Specimen:   Blood Updated:  12/11/17 0958     WBC 4.45 (L) 10*3/mm3      RBC 4.53 (L) 10*6/mm3      Hemoglobin 13.4 (L) g/dL      Hematocrit 38.9 (L) %      MCV 85.9 fL      MCH 29.6 pg      MCHC 34.4 g/dL      RDW 15.8 (H) %      RDW-SD 49.9 fl      MPV 9.4 fL      Platelets 85 (L) 10*3/mm3     Ethanol [[id number]]  (Abnormal) Collected:  12/11/17 0919    Specimen:  Blood Updated:  12/11/17 0948     Ethanol 239 (H) mg/dL      Ethanol % 0.239 %     Protime-INR [[id number]]  (Abnormal) Collected:  12/11/17 0919    Specimen:  Blood Updated:  12/11/17 0959     Protime 16.6 (H) Seconds      INR 1.40 (H)    aPTT [[id number]]  (Normal) Collected:  12/11/17 0919    Specimen:  Blood Updated:  12/11/17 0959     PTT 29.0 seconds     Lipase [[id number]]  (Normal) Collected:  12/11/17 0919    Specimen:  Blood Updated:  12/11/17 0948     Lipase 40 U/L     Scan Slide [[id number]] Collected:  12/11/17 0919    Specimen:  Blood Updated:  12/11/17 0958     Scan Slide --      See Manual Differential Results       Manual Differential [[id number]]  (Abnormal) Collected:  12/11/17 0919    Specimen:  Blood Updated:  12/11/17 0958     Neutrophil % 37.0 (L) %      Lymphocyte % 55.0 (H) %      Monocyte % 7.0 %      Eosinophil % 1.0 %      Neutrophils Absolute 1.65 (L) 10*3/mm3      Lymphocytes Absolute 2.45 10*3/mm3      Monocytes Absolute 0.31 10*3/mm3      Eosinophils Absolute 0.04 10*3/mm3      RBC Morphology Normal     WBC Morphology Normal     Platelet Morphology Normal    Urinalysis With / Culture If Indicated - Urine, Clean Catch [[id number]]  (Normal) Collected:  12/11/17 0930    Specimen:  Urine from Urine, Catheter Updated:  12/11/17 0942     Color, UA Yellow     Appearance, UA Clear     pH, UA 6.5     Specific Gravity, UA 1.010     Glucose, UA Negative     Ketones, UA Negative     Bilirubin, UA Negative     Blood, UA Negative     Protein, UA Negative     Leuk Esterase, UA Negative     Nitrite, UA Negative     Urobilinogen, UA 1.0 E.U./dL     Narrative:       Urine microscopic not indicated.    Urine Drug Screen - Urine, Clean Catch [[id number]]  (Normal) Collected:  12/11/17 0930    Specimen:  Urine from Urine, Catheter Updated:  12/11/17 1002     Amphet/Methamphet, Screen Negative     Barbiturates Screen, Urine Negative     Benzodiazepine Screen, Urine Negative     Cocaine Screen, Urine Negative     Opiate Screen Negative     THC, Screen, Urine Negative     Methadone Screen, Urine Negative     Oxycodone Screen, Urine Negative    Narrative:       Negative Thresholds For Drugs Screened:     Amphetamines               500 ng/ml   Barbiturates               200 ng/ml   Benzodiazepines            100 ng/ml   Cocaine                    300 ng/ml   Methadone                  300 ng/ml   Opiates                    300 ng/ml   Oxycodone                  100 ng/ml   THC                        50 ng/ml    The Normal Value for all drugs tested is negative. This report includes final unconfirmed screening results to be used for medical treatment purposes only. Unconfirmed results must not be used for non-medical purposes such as employment or legal testing. Clinical consideration should be applied to any drug of abuse test, particulary when unconfirmed results are used.          I ordered the above labs and reviewed the results    RADIOLOGY  CT Head Without Contrast   Preliminary Result   There is mild to moderate small vessel disease in the   cerebral white matter and a 5 mm old lacunar infarct in the head of the   left caudate nucleus and calcified atherosclerotic plaques in the   cavernous segments of internal carotid arteries and distal intracranial   segments of the vertebral arteries bilaterally. The remainder of the   head CT is within normal limits with no acute skull fracture or   intracranial hemorrhage identified.       Radiation dose reduction techniques were utilized, including automated   exposure control and exposure modulation based on body size.               CT Head was negative     I ordered the above noted radiological studies. Interpreted by radiologist. Discussed with radiologist (Dr. Cooper). Reviewed by me in PACS.       PROCEDURES  Procedures      PROGRESS AND CONSULTS  ED Course     0910  Ordered IVF, labs, and CT head for further evaluation. Ordered banana bag for hydration.    1125  Placed consult to ACCESS.     1130  Rechecked pt who is sleeping and in no acute distress.   Pt has a [id number]/84, HR is 86, and has O2 Sat of 94% sleeping and on room air. Pt woke easily to verbal stimuli; discussed negative lab and radiology results with pt. Plan t have [facility]ESS evaluate pt for pt's alcohol abuse. Pt understands and agrees to plan, all questions addressed st this time.     1502  Revaluated pt. Pt has normal vital signs. PT is sleeping and in no acute distress.     1519  Discussed pt's case with ACCESS who states pt can be discharged.     1521  Rechecked pt and discussed there are no signs of pancreatitis, electrolyte imbalance, or dehydration. Repeat abdominal exam is benign.  Discussed negative lab and radiology results. No signs of significant alcohol withdraw on re-evaluation; pt is stable [stable vital signs]. Reviewed old records which show that pt has chronic hx of alcoholism. Pt has sought help before, but does not f/u with the referrals given to the pt. Plan to discharge pt. Pt understands and agrees to plan, all questions addressed at this time.     MEDICAL DECISION MAKING  Results were reviewed/discussed with the patient and they were also made aware of online access. Pt also made aware that some labs, such as cultures, will not be resulted during ER visit and follow up with PMD is necessary.     MDM  Number of Diagnoses or Management Options     Amount and/or Complexity of Data Reviewed  Clinical lab tests: ordered and reviewed (Ethanol = 239)  Tests in the radiology section of CPT®: ordered and reviewed (CT Head: Negative )  Decide to obtain  previous medical records or to obtain history from someone other than the patient: yes  Review and summarize past medical records: yes (Pt was seen 12/7/17 for alcohol   10/6/17 was [facility] at [facility] for alcohol abuse  Pt has mulile ER visits in [facility] apst year for alcoholism and depression    )  Independent visualization of images, tracings, or specimens: yes           DIAGNOSIS  Final diagnoses:   Alcoholism   Alcoholic intoxication without complication       DISPOSITION  DISCHARGE    Patient discharged in stable condition.    Reviewed implications of results, diagnosis, meds, responsibility to follow up, warning signs and symptoms of possible worsening, potential complications and reasons to return to ER.    Patient/Family voiced understanding of above instructions.    Discussed plan for discharge, as there is no emergent indication for admission.  Pt/family is agreeable and understands need for follow up and repeat testing.  Pt is aware that discharge does not mean that nothing is wrong but it indicates no emergency is present that requires admission and they must continue care with follow-up as given below or physician of their choice.     FOLLOW-UP  MAKSIM Sanders  [address] [place] [address]  [phone]    In 2 days  Return if pain worsens, If symptoms worsen, shortness of breath, fever, any concerns         Medication List      Notice     No changes were made to your prescriptions during this visit.            Latest Documented Vital Signs:  As of 5:41 PM  BP- 149/84 HR- 82 Temp- 98.3 °F (36.8 °C) (Oral) O2 sat- 92%    --  Documentation assistance provided by andrew Guo for Dr. Tay.  Information recorded by the scribe was done at my direction and has been verified and validated by me.                 Finesse Guo  12/11/17 1532       Anastacio Tay MD  12/11/17 3205

## 2017-12-11 NOTE — ED NOTES
"Pt states, \"I have been drinking since I got out of the hospital.\"     Brenda Ambriz RN  12/11/17 0804    "

## 2017-12-11 NOTE — CONSULTS
Access Center Assessment:  Pt is a 58 yo white single male.  Pt presented to ED intoxicated indicating he wanted to quit drinking.  BAL was 239 upon admission to ED.  Pt has been to [facility] several times in the past for the same issues.  His last [facility] consult was on 9/11/17.  Pt was cooperative with interview.  Pt is A&Ox4.  No SI/HI.  Pt states he seeing bugs.  No other withdrawal symptoms noted.  ED MD indicates tests are coming back negative.  Pt states that he recently went to rehab in [place] at [facility] and from there he went to a facility in [place].  Pt states he returned home on 11/3/17 and started drinking immediately.  He says he has been drinking whiskey and beer daily.  Discussed treatment options.  Pt is well versed in treatment facilities locally.  Pt states he wants to go back to [facility] because they can give him Vivitrol.  Pt has the contact information for [place].  Anticipate d/c to home and [facility] once medically cleared.

## 2017-12-11 NOTE — DISCHARGE INSTRUCTIONS
Follow up with referrals as provided by access for alcohol treatment. Follow up with [facility].

## 2018-05-17 ENCOUNTER — INPATIENT HOSPITAL (OUTPATIENT)
Dept: URBAN - METROPOLITAN AREA HOSPITAL 107 | Facility: HOSPITAL | Age: 58
End: 2018-05-17

## 2018-05-17 DIAGNOSIS — K59.00 CONSTIPATION, UNSPECIFIED: ICD-10-CM

## 2018-05-17 DIAGNOSIS — K80.20 CALCULUS OF GALLBLADDER WITHOUT CHOLECYSTITIS WITHOUT OBSTRU: ICD-10-CM

## 2018-05-17 DIAGNOSIS — R10.9 UNSPECIFIED ABDOMINAL PAIN: ICD-10-CM

## 2018-05-17 DIAGNOSIS — K74.60 UNSPECIFIED CIRRHOSIS OF LIVER: ICD-10-CM

## 2018-05-17 PROCEDURE — 99221 1ST HOSP IP/OBS SF/LOW 40: CPT

## 2018-06-21 ENCOUNTER — INPATIENT HOSPITAL (OUTPATIENT)
Dept: URBAN - METROPOLITAN AREA HOSPITAL 90 | Facility: HOSPITAL | Age: 58
End: 2018-06-21

## 2018-06-21 DIAGNOSIS — F10.10 ALCOHOL ABUSE, UNCOMPLICATED: ICD-10-CM

## 2018-06-21 DIAGNOSIS — K85.20 ALCOHOL INDUCED ACUTE PANCREATITIS WITHOUT NECROSIS OR INFEC: ICD-10-CM

## 2018-06-21 DIAGNOSIS — K70.40 ALCOHOLIC HEPATIC FAILURE WITHOUT COMA: ICD-10-CM

## 2018-06-21 DIAGNOSIS — K70.30 ALCOHOLIC CIRRHOSIS OF LIVER WITHOUT ASCITES: ICD-10-CM

## 2018-06-21 PROCEDURE — 99222 1ST HOSP IP/OBS MODERATE 55: CPT

## 2019-01-01 ENCOUNTER — INPATIENT HOSPITAL (OUTPATIENT)
Dept: URBAN - METROPOLITAN AREA HOSPITAL 90 | Facility: HOSPITAL | Age: 59
End: 2019-01-01

## 2019-01-01 DIAGNOSIS — K70.10 ALCOHOLIC HEPATITIS WITHOUT ASCITES: ICD-10-CM

## 2019-01-01 DIAGNOSIS — K31.89 OTHER DISEASES OF STOMACH AND DUODENUM: ICD-10-CM

## 2019-01-01 DIAGNOSIS — D50.0 IRON DEFICIENCY ANEMIA SECONDARY TO BLOOD LOSS (CHRONIC): ICD-10-CM

## 2019-01-01 PROCEDURE — 43235 EGD DIAGNOSTIC BRUSH WASH: CPT

## 2021-03-02 NOTE — PLAN OF CARE
Problem: Patient Care Overview (Adult)  Goal: Plan of Care Review  Outcome: Ongoing (interventions implemented as appropriate)    09/20/17 2955   Coping/Psychosocial Response Interventions   Plan Of Care Reviewed With patient   Patient Care Overview   Progress improving   Outcome Evaluation   Outcome Summary/Follow up Plan No falls. CIWA scale 7T last check down from 18 this am. BP meds adjusted. TEDs and SCDs applied as ordered. Skin clear. No distress noted. Voiding per urinal. Cont to monitor.        Goal: Adult Individualization and Mutuality  Outcome: Ongoing (interventions implemented as appropriate)  Goal: Discharge Needs Assessment  Outcome: Ongoing (interventions implemented as appropriate)    Problem: Acute Alcohol Withdrawal Syndrome, Risk For/Actual (Adult)  Goal: Signs and Symptoms of Listed Potential Problems Will be Absent or Manageable (Acute Alcohol Withdrawal Syndrome, Risk For/Actual)  Outcome: Ongoing (interventions implemented as appropriate)    Problem: Skin Integrity Impairment, Risk/Actual (Adult)  Goal: Skin Integrity/Wound Healing  Outcome: Ongoing (interventions implemented as appropriate)    Problem: Fall Risk (Adult)  Goal: Absence of Falls  Outcome: Ongoing (interventions implemented as appropriate)       Home